# Patient Record
Sex: FEMALE | Race: WHITE | NOT HISPANIC OR LATINO
[De-identification: names, ages, dates, MRNs, and addresses within clinical notes are randomized per-mention and may not be internally consistent; named-entity substitution may affect disease eponyms.]

---

## 2021-09-16 ENCOUNTER — RESULT REVIEW (OUTPATIENT)
Age: 61
End: 2021-09-16

## 2024-05-05 ENCOUNTER — RESULT REVIEW (OUTPATIENT)
Age: 64
End: 2024-05-05

## 2024-05-05 ENCOUNTER — TRANSCRIPTION ENCOUNTER (OUTPATIENT)
Age: 64
End: 2024-05-05

## 2024-05-05 ENCOUNTER — INPATIENT (INPATIENT)
Facility: HOSPITAL | Age: 64
LOS: 4 days | DRG: 395 | End: 2024-05-10
Attending: STUDENT IN AN ORGANIZED HEALTH CARE EDUCATION/TRAINING PROGRAM | Admitting: STUDENT IN AN ORGANIZED HEALTH CARE EDUCATION/TRAINING PROGRAM
Payer: MEDICAID

## 2024-05-05 VITALS
RESPIRATION RATE: 20 BRPM | HEART RATE: 79 BPM | SYSTOLIC BLOOD PRESSURE: 135 MMHG | OXYGEN SATURATION: 98 % | HEIGHT: 67 IN | DIASTOLIC BLOOD PRESSURE: 80 MMHG | WEIGHT: 114.64 LBS

## 2024-05-05 DIAGNOSIS — K55.9 VASCULAR DISORDER OF INTESTINE, UNSPECIFIED: ICD-10-CM

## 2024-05-05 LAB
ABO RH CONFIRMATION: SIGNIFICANT CHANGE UP
ALBUMIN SERPL ELPH-MCNC: 2.9 G/DL — LOW (ref 3.3–5.2)
ALP SERPL-CCNC: 99 U/L — SIGNIFICANT CHANGE UP (ref 40–120)
ALT FLD-CCNC: 12 U/L — SIGNIFICANT CHANGE UP
ANION GAP SERPL CALC-SCNC: 15 MMOL/L — SIGNIFICANT CHANGE UP (ref 5–17)
ANISOCYTOSIS BLD QL: SIGNIFICANT CHANGE UP
APTT BLD: 37 SEC — HIGH (ref 24.5–35.6)
AST SERPL-CCNC: 22 U/L — SIGNIFICANT CHANGE UP
BASE EXCESS BLDV CALC-SCNC: -6 MMOL/L — LOW (ref -2–3)
BASOPHILS # BLD AUTO: 0 K/UL — SIGNIFICANT CHANGE UP (ref 0–0.2)
BASOPHILS NFR BLD AUTO: 0 % — SIGNIFICANT CHANGE UP (ref 0–2)
BILIRUB SERPL-MCNC: 0.5 MG/DL — SIGNIFICANT CHANGE UP (ref 0.4–2)
BLD GP AB SCN SERPL QL: SIGNIFICANT CHANGE UP
BUN SERPL-MCNC: 15.2 MG/DL — SIGNIFICANT CHANGE UP (ref 8–20)
BURR CELLS BLD QL SMEAR: PRESENT — SIGNIFICANT CHANGE UP
CA-I SERPL-SCNC: 1.08 MMOL/L — LOW (ref 1.15–1.33)
CALCIUM SERPL-MCNC: 8.3 MG/DL — LOW (ref 8.4–10.5)
CHLORIDE BLDV-SCNC: 102 MMOL/L — SIGNIFICANT CHANGE UP (ref 96–108)
CHLORIDE SERPL-SCNC: 100 MMOL/L — SIGNIFICANT CHANGE UP (ref 96–108)
CO2 SERPL-SCNC: 21 MMOL/L — LOW (ref 22–29)
CREAT SERPL-MCNC: 0.79 MG/DL — SIGNIFICANT CHANGE UP (ref 0.5–1.3)
EGFR: 85 ML/MIN/1.73M2 — SIGNIFICANT CHANGE UP
EOSINOPHIL # BLD AUTO: 0 K/UL — SIGNIFICANT CHANGE UP (ref 0–0.5)
EOSINOPHIL NFR BLD AUTO: 0 % — SIGNIFICANT CHANGE UP (ref 0–6)
GAS PNL BLDV: 133 MMOL/L — LOW (ref 136–145)
GAS PNL BLDV: SIGNIFICANT CHANGE UP
GIANT PLATELETS BLD QL SMEAR: PRESENT — SIGNIFICANT CHANGE UP
GLUCOSE BLDV-MCNC: 50 MG/DL — CRITICAL LOW (ref 70–99)
GLUCOSE SERPL-MCNC: 51 MG/DL — CRITICAL LOW (ref 70–99)
HCO3 BLDV-SCNC: 21 MMOL/L — LOW (ref 22–29)
HCT VFR BLD CALC: 32.8 % — LOW (ref 34.5–45)
HCT VFR BLDA CALC: 31 % — SIGNIFICANT CHANGE UP
HGB BLD CALC-MCNC: 10.3 G/DL — LOW (ref 11.7–16.1)
HGB BLD-MCNC: 10.3 G/DL — LOW (ref 11.5–15.5)
INR BLD: 2.45 RATIO — HIGH (ref 0.85–1.18)
LACTATE BLDV-MCNC: 4.4 MMOL/L — CRITICAL HIGH (ref 0.5–2)
LYMPHOCYTES # BLD AUTO: 2.59 K/UL — SIGNIFICANT CHANGE UP (ref 1–3.3)
LYMPHOCYTES # BLD AUTO: 6.2 % — LOW (ref 13–44)
MACROCYTES BLD QL: SIGNIFICANT CHANGE UP
MANUAL SMEAR VERIFICATION: SIGNIFICANT CHANGE UP
MCHC RBC-ENTMCNC: 19.3 PG — LOW (ref 27–34)
MCHC RBC-ENTMCNC: 31.4 GM/DL — LOW (ref 32–36)
MCV RBC AUTO: 61.3 FL — LOW (ref 80–100)
MONOCYTES # BLD AUTO: 1.09 K/UL — HIGH (ref 0–0.9)
MONOCYTES NFR BLD AUTO: 2.6 % — SIGNIFICANT CHANGE UP (ref 2–14)
NEUTROPHILS # BLD AUTO: 38.15 K/UL — HIGH (ref 1.8–7.4)
NEUTROPHILS NFR BLD AUTO: 91.2 % — HIGH (ref 43–77)
OVALOCYTES BLD QL SMEAR: SLIGHT — SIGNIFICANT CHANGE UP
PCO2 BLDV: 47 MMHG — HIGH (ref 39–42)
PH BLDV: 7.26 — LOW (ref 7.32–7.43)
PLAT MORPH BLD: NORMAL — SIGNIFICANT CHANGE UP
PLATELET # BLD AUTO: 320 K/UL — SIGNIFICANT CHANGE UP (ref 150–400)
PO2 BLDV: 73 MMHG — HIGH (ref 25–45)
POIKILOCYTOSIS BLD QL AUTO: SIGNIFICANT CHANGE UP
POLYCHROMASIA BLD QL SMEAR: SLIGHT — SIGNIFICANT CHANGE UP
POTASSIUM BLDV-SCNC: 4.4 MMOL/L — SIGNIFICANT CHANGE UP (ref 3.5–5.1)
POTASSIUM SERPL-MCNC: 4.3 MMOL/L — SIGNIFICANT CHANGE UP (ref 3.5–5.3)
POTASSIUM SERPL-SCNC: 4.3 MMOL/L — SIGNIFICANT CHANGE UP (ref 3.5–5.3)
PROT SERPL-MCNC: 5.8 G/DL — LOW (ref 6.6–8.7)
PROTHROM AB SERPL-ACNC: 26.5 SEC — HIGH (ref 9.5–13)
RBC # BLD: 5.35 M/UL — HIGH (ref 3.8–5.2)
RBC # FLD: 21.6 % — HIGH (ref 10.3–14.5)
RBC BLD AUTO: ABNORMAL
SAO2 % BLDV: 94.6 % — SIGNIFICANT CHANGE UP
SMUDGE CELLS # BLD: PRESENT — SIGNIFICANT CHANGE UP
SODIUM SERPL-SCNC: 136 MMOL/L — SIGNIFICANT CHANGE UP (ref 135–145)
TARGETS BLD QL SMEAR: SIGNIFICANT CHANGE UP
WBC # BLD: 41.83 K/UL — CRITICAL HIGH (ref 3.8–10.5)
WBC # FLD AUTO: 41.83 K/UL — CRITICAL HIGH (ref 3.8–10.5)

## 2024-05-05 PROCEDURE — 99285 EMERGENCY DEPT VISIT HI MDM: CPT

## 2024-05-05 PROCEDURE — 88307 TISSUE EXAM BY PATHOLOGIST: CPT | Mod: 26

## 2024-05-05 PROCEDURE — 99221 1ST HOSP IP/OBS SF/LOW 40: CPT | Mod: 57

## 2024-05-05 DEVICE — IMPLANTABLE DEVICE: Type: IMPLANTABLE DEVICE | Status: FUNCTIONAL

## 2024-05-05 DEVICE — WIRE GD SION BLUE STRT TIP 300CM: Type: IMPLANTABLE DEVICE | Status: FUNCTIONAL

## 2024-05-05 DEVICE — GWIRE VASC ENTRY MINISTICK MAX 5FR 10CM: Type: IMPLANTABLE DEVICE | Status: FUNCTIONAL

## 2024-05-05 DEVICE — ARISTA 3GR: Type: IMPLANTABLE DEVICE | Status: FUNCTIONAL

## 2024-05-05 DEVICE — CATH QUICK CROSS .035X135CM: Type: IMPLANTABLE DEVICE | Status: FUNCTIONAL

## 2024-05-05 DEVICE — SHEATH INTRODUCER TERUMO PINNACLE PERIPHERAL 6FR X 10CM X 0.035" MINI WIRE: Type: IMPLANTABLE DEVICE | Status: FUNCTIONAL

## 2024-05-05 DEVICE — CATH QUICK CROSS .014X135CM: Type: IMPLANTABLE DEVICE | Status: FUNCTIONAL

## 2024-05-05 DEVICE — SHEATH INTRODUCER TERUMO PINNACLE PERIPHERAL 7FR X 10CM X 0.035" MINI WIRE: Type: IMPLANTABLE DEVICE | Status: FUNCTIONAL

## 2024-05-05 DEVICE — BLLN COYOTE OTW 3X60MMX150CM: Type: IMPLANTABLE DEVICE | Status: FUNCTIONAL

## 2024-05-05 DEVICE — SURGICEL FIBRILLAR 4 X 4": Type: IMPLANTABLE DEVICE | Status: FUNCTIONAL

## 2024-05-05 DEVICE — STAPLER COVIDIEN TRI-STAPLE CURVED 45MM TAN RELOAD: Type: IMPLANTABLE DEVICE | Status: FUNCTIONAL

## 2024-05-05 RX ORDER — FENTANYL CITRATE 50 UG/ML
50 INJECTION INTRAVENOUS ONCE
Refills: 0 | Status: DISCONTINUED | OUTPATIENT
Start: 2024-05-05 | End: 2024-05-05

## 2024-05-05 RX ORDER — PIPERACILLIN AND TAZOBACTAM 4; .5 G/20ML; G/20ML
3.38 INJECTION, POWDER, LYOPHILIZED, FOR SOLUTION INTRAVENOUS ONCE
Refills: 0 | Status: COMPLETED | OUTPATIENT
Start: 2024-05-05 | End: 2024-05-05

## 2024-05-05 RX ORDER — FENTANYL CITRATE 50 UG/ML
25 INJECTION INTRAVENOUS ONCE
Refills: 0 | Status: DISCONTINUED | OUTPATIENT
Start: 2024-05-05 | End: 2024-05-05

## 2024-05-05 RX ORDER — PIPERACILLIN AND TAZOBACTAM 4; .5 G/20ML; G/20ML
3.38 INJECTION, POWDER, LYOPHILIZED, FOR SOLUTION INTRAVENOUS EVERY 8 HOURS
Refills: 0 | Status: DISCONTINUED | OUTPATIENT
Start: 2024-05-06 | End: 2024-05-07

## 2024-05-05 RX ORDER — PIPERACILLIN AND TAZOBACTAM 4; .5 G/20ML; G/20ML
3.38 INJECTION, POWDER, LYOPHILIZED, FOR SOLUTION INTRAVENOUS ONCE
Refills: 0 | Status: COMPLETED | OUTPATIENT
Start: 2024-05-06 | End: 2024-05-06

## 2024-05-05 RX ORDER — ACETAMINOPHEN 500 MG
1000 TABLET ORAL ONCE
Refills: 0 | Status: COMPLETED | OUTPATIENT
Start: 2024-05-05 | End: 2024-05-05

## 2024-05-05 RX ORDER — HEPARIN SODIUM 5000 [USP'U]/ML
4000 INJECTION INTRAVENOUS; SUBCUTANEOUS EVERY 6 HOURS
Refills: 0 | Status: DISCONTINUED | OUTPATIENT
Start: 2024-05-05 | End: 2024-05-07

## 2024-05-05 RX ORDER — CHLORHEXIDINE GLUCONATE 213 G/1000ML
1 SOLUTION TOPICAL DAILY
Refills: 0 | Status: DISCONTINUED | OUTPATIENT
Start: 2024-05-05 | End: 2024-05-07

## 2024-05-05 RX ORDER — HEPARIN SODIUM 5000 [USP'U]/ML
2000 INJECTION INTRAVENOUS; SUBCUTANEOUS EVERY 6 HOURS
Refills: 0 | Status: DISCONTINUED | OUTPATIENT
Start: 2024-05-05 | End: 2024-05-07

## 2024-05-05 RX ORDER — HEPARIN SODIUM 5000 [USP'U]/ML
INJECTION INTRAVENOUS; SUBCUTANEOUS
Qty: 25000 | Refills: 0 | Status: DISCONTINUED | OUTPATIENT
Start: 2024-05-05 | End: 2024-05-06

## 2024-05-05 RX ADMIN — Medication 1000 MILLIGRAM(S): at 22:00

## 2024-05-05 RX ADMIN — CHLORHEXIDINE GLUCONATE 1 APPLICATION(S): 213 SOLUTION TOPICAL at 21:51

## 2024-05-05 RX ADMIN — HEPARIN SODIUM 1000 UNIT(S)/HR: 5000 INJECTION INTRAVENOUS; SUBCUTANEOUS at 21:13

## 2024-05-05 RX ADMIN — Medication 400 MILLIGRAM(S): at 21:27

## 2024-05-05 RX ADMIN — FENTANYL CITRATE 50 MICROGRAM(S): 50 INJECTION INTRAVENOUS at 21:02

## 2024-05-05 RX ADMIN — FENTANYL CITRATE 50 MICROGRAM(S): 50 INJECTION INTRAVENOUS at 21:25

## 2024-05-05 RX ADMIN — FENTANYL CITRATE 25 MICROGRAM(S): 50 INJECTION INTRAVENOUS at 21:51

## 2024-05-05 NOTE — H&P ADULT - ASSESSMENT
ASSESSMENT: 62yo F presenting with acute mesenteric ischemia.    PLAN:  - to go to OR emergently with vascular and general surgery   - IV Abx  - hep gtt  - SICU aware  - rest of plan pending operative findings    Pt seen and plan discussed with attending, Dr. Zazueta

## 2024-05-05 NOTE — ED PROVIDER NOTE - PHYSICAL EXAMINATION
Gen: no acute distress  Head: normocephalic, atraumatic  EENT: EOMI  Lung: no increased work of breathing  CV:  no LE edema  Abd: soft, TTP in all 4 quadrants, +rebound tenderness +guarding  MSK: no visible deformities, full range of motion in all 4 extremities  Neuro: A&Ox4; No focal neurologic deficits

## 2024-05-05 NOTE — ED PROVIDER NOTE - OBJECTIVE STATEMENT
63-year-old female with PMH peripheral artery disease, renal artery stenosis, DVT on Eliquis transferred from [facility] for ischemic bowel and pneumoperitoneum.  Patient reports that she started having abdominal pain last night radiating to the back.  She presented to [facility] where she was found to have significant leukocytosis, lactic acidosis.  CT abdomen and pelvis demonstrated occlusion of her SMA and NELLIE with evidence of ischemic bowel and moderate pneumoperitoneum.  She was given 1 unit of FFP for elevated INR in the setting of DOAC use and was transferred to [facility] for definitive surgical management.

## 2024-05-05 NOTE — ED ADULT NURSE NOTE - CHIEF COMPLAINT QUOTE
BIBA b transfer from [facility] diagnosed with mesenteric  ischemic bowel and SBO on Plavix/ Xarelto  - Pt received 100 mcg of Fentanyl PTA / 1 Unit of PLT. Pt brought to [facility] for further abdoul and  treatment

## 2024-05-05 NOTE — ED ADULT NURSE NOTE - OBJECTIVE STATEMENT
Pt transfer from [place] c/o of abd pain x 1 month. Pt transfer from [place] c/o of abd pain x 1 month. CT abd showed small bowel dilatation with a transition point in the mid pelvis, moderate vol pneumoperitoneum and eccentric abd aortic thrombus/infrarenal aortic occlusion. 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    Pt received FFP en route. A&O SR on monitor. Tolerating RA. Skin-not intact as per pt unable to assess due pt pain level, meds provided for pain. Blood work obtained. Heparin drip started at 1000 units/hr

## 2024-05-05 NOTE — H&P ADULT - NSHPLABSRESULTS_GEN_ALL_CORE
CT ABD PEL W IV CONTRAST [id number]      PROCEDURE DATE: May 5 2024      CLINICAL INFORMATION: Abdominal pain and urinary retention.    COMPARISON: CT abdomen pelvis from 3/25/2024.    CONTRAST/COMPLICATIONS:  IV Contrast: Omnipaque 350 90 cc administered 10 cc discarded  Oral Contrast: NONE  Complications: None reported at time of study completion    PROCEDURE:  CT of the Abdomen and Pelvis was performed.  Sagittal and coronal reformats were performed.    FINDINGS:  LOWER CHEST: Within normal limits.    LIVER: Within normal limits.  BILE DUCTS: Normal caliber.  GALLBLADDER: Within normal limits.  SPLEEN: Within normal limits.  PANCREAS: Mild dilatation of the main pancreatic duct measuring up to 5 mm,  new from 3/25/2024.  ADRENALS: Stable thickening of the bilateral adrenal glands.  KIDNEYS/URETERS: Atrophic right kidney with multiple scattered cysts. No  hydronephrosis bilaterally. 2.7 cm left kidney lower pole cyst.    BLADDER: Within normal limits.  REPRODUCTIVE ORGANS: Uterus and adnexa within normal limits.    BOWEL: Multiple dilated loops of proximal small bowel to 2.5 cm with  air-fluid levels and a transition point in the mid pelvis (3:103). There is  circumferential wall thickening of the dilated loops of small bowel with  adjacent fatty stranding. Multiple locules of free air, most prominent in  the right hemiabdomen (3:58-91).  PERITONEUM: Moderate volume pneumoperitoneum.  VESSELS: Eccentric thrombus within the thoracic and proximal abdominal aorta  with complete total occlusion of the infrarenal aorta and bilateral common  iliac arteries. Reconstitution of diminutive bilateral external iliac  arteries via collaterals. Patent left renal artery stent. Severe stenosis of  the origin of the superior mesenteric artery. Complete occlusion of the  ostium of the celiac and inferior mesenteric arteries.  RETROPERITONEUM/LYMPH NODES: No lymphadenopathy.  ABDOMINAL WALL: Within normal limits.  BONES: Degenerative changes.    IMPRESSION:  Small bowel dilatation with a transition point in the mid pelvis, moderate  volume pneumoperitoneum, and eccentric abdominal aortic thrombus/infrarenal  aortic occlusion. Severe stenosis of the origin of the superior mesenteric  artery and complete occlusion of the ostium of the celiac and inferior  mesenteric arteries. These findings are concerning for mesenteric ischemia  with bowel perforation likely in the right mid abdomen.    Mild dilatation of the main pancreatic duct measured 5 mm, new from  3/25/2024.    These emergent findings were discussed with, and acknowledged by, Dr. Belcher  at 5/5/2024 5:45 PM by Dr. Carreon.

## 2024-05-05 NOTE — ED PROVIDER NOTE - CLINICAL SUMMARY MEDICAL DECISION MAKING FREE TEXT BOX
63-year-old female with PMH peripheral artery disease, renal artery stenosis, DVT on Eliquis transferred from [facility] for ischemic bowel and pneumoperitoneum.   Patient with evidence of peritonitis on exam.  She is currently hemodynamically stable.  Status post 1 unit of FFP.  Surgery at bedside.  She is to be taken to the OR for surgical management. In the meantime, she is to be started on heparin gtt for significant occlusion of the SMA and NELLIE.

## 2024-05-05 NOTE — H&P ADULT - NSHPPHYSICALEXAM_GEN_ALL_CORE
GENERAL: NAD  HEAD:  Atraumatic, normocephalic  NECK: Supple, no JVD  HEART: RRR  LUNGS: Unlabored respirations. No conversational dyspnea.   ABDOMEN: Soft, extremely tender to palpation, especially in epigastrium  EXTREMITIES: No clubbing, cyanosis, or edema  NERVOUS SYSTEM:  A&Ox3, no focal deficits   SKIN: No rashes or lesions

## 2024-05-05 NOTE — PATIENT PROFILE ADULT - FALL HARM RISK - HARM RISK INTERVENTIONS

## 2024-05-05 NOTE — CHART NOTE - NSCHARTNOTEFT_GEN_A_CORE
Patient evaluated at the bedside.   Transferred from [place] with elevated WBC, lactate and positive pneumatosis on CT. 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 SMA stenosis due to progression of thrombus at thoracoabdominal Ao. Chronic R RA occl.   Poor prognosis.   She is peritonitic and has very challenging anatomy given presence of distal aortic occl and thoracoabdominal aneurysm with large thrombus burden.   Will attempt retrograde SMA stent.   Consent in the chart.

## 2024-05-05 NOTE — ED ADULT TRIAGE NOTE - STATUS:
PRE-SEDATION ASSESSMENT    CONSENT  Risks, benefits, and alternatives have been discussed with the patient/patient representative, and patient/patient representative agrees to proceed: Yes    MEDICAL HISTORY  Significant medical/surgical history: Yes (DM, CKD, spinal fusion)  Past Complications with Sedation/Anesthesia: No  Significant Family History: No  Smoking History: No  Alcohol/Drug abuse: No  Possible Pregnancy (LMP): Not Applicable  Cardiac History: No  Respiratory History: Yes (RAD)    PHYSICAL EXAM  History and Physical Reviewed: H&P completed today  Airway Risk History: No previous complications  Airway Anatomy : Class III  Heart : Normal  Lungs : Normal  LOC/Mental Status : Normal    OTHER FINDINGS  Reviewed current medications and allergies: Yes  Pertinent lab/diagnostic test reviewed: Yes    SEDATION RISK ASSESSMENT  Risk Status ASA: Class III - Severe systemic disease, limits activity, is not incapacitated  Plan for Sedation: Moderate Sedation  Indications for Procedure/Pre-Procedure Diagnosis and Planned Procedure: aortic aneurysm/ aortogram  EKG Monitoring: Yes    NARRATIVE FINDINGS     
Applied

## 2024-05-05 NOTE — H&P ADULT - HISTORY OF PRESENT ILLNESS
62yo F w/ hx of PAD, carotid stenosis, distal aorta occlusion, renal artery stenosis s/p L renal stent, MI, tobacco use, DVT on Eliquis presenting with abdominal pain and imaging findings consistent with mesenteric ischemia. Pt states that she began feeling stabbing pain in her abdomen last night prompting her to come to ED. She has never had this pain before. On arrival to [place] she was HDS, labs notable for elevated lactate and WBC 40k. CT A/P demonstrating moderate volume pneumoperitoneum and severe stenosis of SMA and complete occlusion of ostium of celiac and inferior mesenteric arteries. Likely ischemic bowel. Transferred to [facility] for further management.

## 2024-05-05 NOTE — ED PROVIDER NOTE - ATTENDING CONTRIBUTION TO CARE
I performed a face to face bedside interview with patient regarding history of present illness, review of symptoms and past medical history. I completed an independent physical exam.  I have discussed patient's plan of care with resident.   I agree with note as stated above including HISTORY OF PRESENT ILLNESS, HIV, PAST MEDICAL/SURGICAL/FAMILY/SOCIAL HISTORY, ALLERGIES AND HOME MEDICATIONS, REVIEW OF SYSTEMS, PHYSICAL EXAM, MEDICAL DECISION MAKING and any PROGRESS NOTES during the time I functioned as the attending physician for this patient unless otherwise noted. My brief assessment is as follows:   62yo F w/ hx of PAD, carotid stenosis, distal aorta occlusion, renal artery stenosis s/p L renal stent, MI, tobacco use, DVT on Eliquis presenting with abdominal pain and imaging findings consistent with mesenteric ischemia. Pt states that she began feeling stabbing pain in her abdomen last night prompting her to come to ED. She has never had this pain before. On arrival to [place] she was HDS, labs notable for elevated lactate and WBC 40k. CT A/P demonstrating moderate volume pneumoperitoneum and severe stenosis of SMA and complete occlusion of ostium of celiac and inferior mesenteric arteries. Likely ischemic bowel. Transferred to [facility] for further management.   General in no acute distress respiratory clear cardiac no murmur abdomen tenderness palpation no distention neuro intact   surgical attending recommendations implemented admitted to SICU

## 2024-05-05 NOTE — ED ADULT TRIAGE NOTE - CHIEF COMPLAINT QUOTE
BIBA b transfer from [facility] diagnosed with mesenteric  ischemic bowel and SBO on Plavix/ Xarelto  - Pt received 100 mcg of Fentanyl PTA / 1 Unit of PLT. Pt brought to [facility] for further abdoul and  treatment

## 2024-05-05 NOTE — ED ADULT NURSE REASSESSMENT NOTE - NS ED NURSE REASSESS COMMENT FT1
ptt from [facility] 45.4. per FRANCISCO Higginbotham-, no heparin bolus. will start heparin at 10 ml/hr as ordered.

## 2024-05-05 NOTE — ED ADULT NURSE NOTE - NSFALLHARMRISKINTERV_ED_ALL_ED

## 2024-05-05 NOTE — H&P ADULT - ATTENDING COMMENTS
63-year-old female with extensive pmhx including CHF, smoking, asthma/COPD, HLD, anemia, bipolar, CAD/MI, CKD, hepatitis C, lap cholecystectomy, tubal ligation, open appendectomy, carotid stenosis, BEULAH s/p L stent, distal aortic occlusion transferred from [facility] with pneumoperitoneum and mesenteric ischemia     Admit to SICU   Emergent OR with ACS/vascular surgery for exploratory laparotomy with potential for but not limited to bowel resection, ostomy, mesenteric angiogram with intervention, and temporary abdominal closure.   Risks, benefits, and alternatives to planned procedure discussed at length with patient and her . They understand that she is high risk for morbidity/mortality with planned intervention and alternatives. They would like to proceed with surgery with no limitations on medical/surgical interventions including CPR, intubations, short/long term feeding tubes, IV fluids, and antibiotics. We did, however, discuss the possibility of complete ischaemia of the intraabdominal viscera and the futility of further interventions in that circumstance.   Hep gtt   IV antibiotics   Med rec

## 2024-05-05 NOTE — ED ADULT TRIAGE NOTE - PRO INTERPRETER NEED 2
PT stated she received a call from a probabtion officer that called her by her maiden name. Pt stated the only person that would know that is her . Pt was in obvious distress with tears in her eyes and pacing. PRN was given and accepted by patient. English

## 2024-05-05 NOTE — PATIENT PROFILE ADULT - VISION (WITH CORRECTIVE LENSES IF THE PATIENT USUALLY WEARS THEM):
detailed exam Partially impaired: cannot see medication labels or newsprint, but can see obstacles in path, and the surrounding layout; can count fingers at arm's length

## 2024-05-06 ENCOUNTER — RESULT REVIEW (OUTPATIENT)
Age: 64
End: 2024-05-06

## 2024-05-06 ENCOUNTER — TRANSCRIPTION ENCOUNTER (OUTPATIENT)
Age: 64
End: 2024-05-06

## 2024-05-06 LAB
ALBUMIN SERPL ELPH-MCNC: 1.9 G/DL — LOW (ref 3.3–5.2)
ALBUMIN SERPL ELPH-MCNC: 2.2 G/DL — LOW (ref 3.3–5.2)
ALP SERPL-CCNC: 63 U/L — SIGNIFICANT CHANGE UP (ref 40–120)
ALP SERPL-CCNC: 75 U/L — SIGNIFICANT CHANGE UP (ref 40–120)
ALT FLD-CCNC: 9 U/L — SIGNIFICANT CHANGE UP
ALT FLD-CCNC: 9 U/L — SIGNIFICANT CHANGE UP
ANION GAP SERPL CALC-SCNC: 12 MMOL/L — SIGNIFICANT CHANGE UP (ref 5–17)
ANION GAP SERPL CALC-SCNC: 15 MMOL/L — SIGNIFICANT CHANGE UP (ref 5–17)
APTT BLD: 54 SEC — HIGH (ref 24.5–35.6)
APTT BLD: 56.7 SEC — HIGH (ref 24.5–35.6)
APTT BLD: 61.6 SEC — HIGH (ref 24.5–35.6)
APTT BLD: 91.3 SEC — HIGH (ref 24.5–35.6)
APTT BLD: >200 SEC — CRITICAL HIGH (ref 24.5–35.6)
AST SERPL-CCNC: 18 U/L — SIGNIFICANT CHANGE UP
AST SERPL-CCNC: 19 U/L — SIGNIFICANT CHANGE UP
BASOPHILS # BLD AUTO: 0.05 K/UL — SIGNIFICANT CHANGE UP (ref 0–0.2)
BASOPHILS # BLD AUTO: 0.06 K/UL — SIGNIFICANT CHANGE UP (ref 0–0.2)
BASOPHILS # BLD AUTO: 0.07 K/UL — SIGNIFICANT CHANGE UP (ref 0–0.2)
BASOPHILS NFR BLD AUTO: 0.2 % — SIGNIFICANT CHANGE UP (ref 0–2)
BILIRUB DIRECT SERPL-MCNC: 0.2 MG/DL — SIGNIFICANT CHANGE UP (ref 0–0.3)
BILIRUB DIRECT SERPL-MCNC: 0.3 MG/DL — SIGNIFICANT CHANGE UP (ref 0–0.3)
BILIRUB INDIRECT FLD-MCNC: 0.1 MG/DL — LOW (ref 0.2–1)
BILIRUB INDIRECT FLD-MCNC: 0.1 MG/DL — LOW (ref 0.2–1)
BILIRUB SERPL-MCNC: 0.3 MG/DL — LOW (ref 0.4–2)
BILIRUB SERPL-MCNC: 0.4 MG/DL — SIGNIFICANT CHANGE UP (ref 0.4–2)
BUN SERPL-MCNC: 10.1 MG/DL — SIGNIFICANT CHANGE UP (ref 8–20)
BUN SERPL-MCNC: 11.4 MG/DL — SIGNIFICANT CHANGE UP (ref 8–20)
BUN SERPL-MCNC: 8.6 MG/DL — SIGNIFICANT CHANGE UP (ref 8–20)
BUN SERPL-MCNC: 9.4 MG/DL — SIGNIFICANT CHANGE UP (ref 8–20)
CALCIUM SERPL-MCNC: 6.7 MG/DL — LOW (ref 8.4–10.5)
CALCIUM SERPL-MCNC: 6.8 MG/DL — LOW (ref 8.4–10.5)
CALCIUM SERPL-MCNC: 7.1 MG/DL — LOW (ref 8.4–10.5)
CALCIUM SERPL-MCNC: 7.3 MG/DL — LOW (ref 8.4–10.5)
CHLORIDE SERPL-SCNC: 101 MMOL/L — SIGNIFICANT CHANGE UP (ref 96–108)
CHLORIDE SERPL-SCNC: 103 MMOL/L — SIGNIFICANT CHANGE UP (ref 96–108)
CHLORIDE SERPL-SCNC: 103 MMOL/L — SIGNIFICANT CHANGE UP (ref 96–108)
CHLORIDE SERPL-SCNC: 107 MMOL/L — SIGNIFICANT CHANGE UP (ref 96–108)
CK SERPL-CCNC: 26 U/L — SIGNIFICANT CHANGE UP (ref 25–170)
CO2 SERPL-SCNC: 17 MMOL/L — LOW (ref 22–29)
CO2 SERPL-SCNC: 17 MMOL/L — LOW (ref 22–29)
CO2 SERPL-SCNC: 18 MMOL/L — LOW (ref 22–29)
CO2 SERPL-SCNC: 18 MMOL/L — LOW (ref 22–29)
CREAT SERPL-MCNC: 0.56 MG/DL — SIGNIFICANT CHANGE UP (ref 0.5–1.3)
CREAT SERPL-MCNC: 0.58 MG/DL — SIGNIFICANT CHANGE UP (ref 0.5–1.3)
CREAT SERPL-MCNC: 0.59 MG/DL — SIGNIFICANT CHANGE UP (ref 0.5–1.3)
CREAT SERPL-MCNC: 0.64 MG/DL — SIGNIFICANT CHANGE UP (ref 0.5–1.3)
EGFR: 100 ML/MIN/1.73M2 — SIGNIFICANT CHANGE UP
EGFR: 102 ML/MIN/1.73M2 — SIGNIFICANT CHANGE UP
EGFR: 103 ML/MIN/1.73M2 — SIGNIFICANT CHANGE UP
EGFR: 104 ML/MIN/1.73M2 — SIGNIFICANT CHANGE UP
EOSINOPHIL # BLD AUTO: 0 K/UL — SIGNIFICANT CHANGE UP (ref 0–0.5)
EOSINOPHIL # BLD AUTO: 0.01 K/UL — SIGNIFICANT CHANGE UP (ref 0–0.5)
EOSINOPHIL # BLD AUTO: 0.07 K/UL — SIGNIFICANT CHANGE UP (ref 0–0.5)
EOSINOPHIL NFR BLD AUTO: 0 % — SIGNIFICANT CHANGE UP (ref 0–6)
EOSINOPHIL NFR BLD AUTO: 0 % — SIGNIFICANT CHANGE UP (ref 0–6)
EOSINOPHIL NFR BLD AUTO: 0.2 % — SIGNIFICANT CHANGE UP (ref 0–6)
FIBRINOGEN PPP-MCNC: 339 MG/DL — SIGNIFICANT CHANGE UP (ref 200–450)
GAS PNL BLDA: SIGNIFICANT CHANGE UP
GLUCOSE SERPL-MCNC: 103 MG/DL — HIGH (ref 70–99)
GLUCOSE SERPL-MCNC: 105 MG/DL — HIGH (ref 70–99)
GLUCOSE SERPL-MCNC: 113 MG/DL — HIGH (ref 70–99)
GLUCOSE SERPL-MCNC: 98 MG/DL — SIGNIFICANT CHANGE UP (ref 70–99)
HCT VFR BLD CALC: 26.6 % — LOW (ref 34.5–45)
HCT VFR BLD CALC: 26.8 % — LOW (ref 34.5–45)
HCT VFR BLD CALC: 27.1 % — LOW (ref 34.5–45)
HCT VFR BLD CALC: 30 % — LOW (ref 34.5–45)
HGB BLD-MCNC: 8.7 G/DL — LOW (ref 11.5–15.5)
HGB BLD-MCNC: 8.9 G/DL — LOW (ref 11.5–15.5)
HGB BLD-MCNC: 8.9 G/DL — LOW (ref 11.5–15.5)
HGB BLD-MCNC: 9.8 G/DL — LOW (ref 11.5–15.5)
IMM GRANULOCYTES NFR BLD AUTO: 1.3 % — HIGH (ref 0–0.9)
IMM GRANULOCYTES NFR BLD AUTO: 1.5 % — HIGH (ref 0–0.9)
IMM GRANULOCYTES NFR BLD AUTO: 2 % — HIGH (ref 0–0.9)
INR BLD: 2.33 RATIO — HIGH (ref 0.85–1.18)
INR BLD: 3.14 RATIO — HIGH (ref 0.85–1.18)
INR BLD: 4.1 RATIO — HIGH (ref 0.85–1.18)
LACTATE SERPL-SCNC: 3.1 MMOL/L — HIGH (ref 0.5–2)
LITHIUM SERPL-MCNC: <0.1 MMOL/L — LOW (ref 0.5–1.5)
LYMPHOCYTES # BLD AUTO: 2.18 K/UL — SIGNIFICANT CHANGE UP (ref 1–3.3)
LYMPHOCYTES # BLD AUTO: 2.35 K/UL — SIGNIFICANT CHANGE UP (ref 1–3.3)
LYMPHOCYTES # BLD AUTO: 2.71 K/UL — SIGNIFICANT CHANGE UP (ref 1–3.3)
LYMPHOCYTES # BLD AUTO: 7.7 % — LOW (ref 13–44)
LYMPHOCYTES # BLD AUTO: 8.3 % — LOW (ref 13–44)
LYMPHOCYTES # BLD AUTO: 8.3 % — LOW (ref 13–44)
MAGNESIUM SERPL-MCNC: 1.3 MG/DL — LOW (ref 1.6–2.6)
MAGNESIUM SERPL-MCNC: 2.3 MG/DL — SIGNIFICANT CHANGE UP (ref 1.6–2.6)
MAGNESIUM SERPL-MCNC: 2.4 MG/DL — SIGNIFICANT CHANGE UP (ref 1.8–2.6)
MAGNESIUM SERPL-MCNC: 2.7 MG/DL — HIGH (ref 1.6–2.6)
MCHC RBC-ENTMCNC: 19.4 PG — LOW (ref 27–34)
MCHC RBC-ENTMCNC: 19.6 PG — LOW (ref 27–34)
MCHC RBC-ENTMCNC: 19.6 PG — LOW (ref 27–34)
MCHC RBC-ENTMCNC: 19.7 PG — LOW (ref 27–34)
MCHC RBC-ENTMCNC: 32.7 GM/DL — SIGNIFICANT CHANGE UP (ref 32–36)
MCHC RBC-ENTMCNC: 32.7 GM/DL — SIGNIFICANT CHANGE UP (ref 32–36)
MCHC RBC-ENTMCNC: 32.8 GM/DL — SIGNIFICANT CHANGE UP (ref 32–36)
MCHC RBC-ENTMCNC: 33.2 GM/DL — SIGNIFICANT CHANGE UP (ref 32–36)
MCV RBC AUTO: 59.2 FL — LOW (ref 80–100)
MCV RBC AUTO: 59.5 FL — LOW (ref 80–100)
MCV RBC AUTO: 60 FL — LOW (ref 80–100)
MCV RBC AUTO: 60 FL — LOW (ref 80–100)
MONOCYTES # BLD AUTO: 0.96 K/UL — HIGH (ref 0–0.9)
MONOCYTES # BLD AUTO: 1.07 K/UL — HIGH (ref 0–0.9)
MONOCYTES # BLD AUTO: 1.19 K/UL — HIGH (ref 0–0.9)
MONOCYTES NFR BLD AUTO: 3.4 % — SIGNIFICANT CHANGE UP (ref 2–14)
MONOCYTES NFR BLD AUTO: 3.6 % — SIGNIFICANT CHANGE UP (ref 2–14)
MONOCYTES NFR BLD AUTO: 3.8 % — SIGNIFICANT CHANGE UP (ref 2–14)
MRSA PCR RESULT.: SIGNIFICANT CHANGE UP
NEUTROPHILS # BLD AUTO: 24.39 K/UL — HIGH (ref 1.8–7.4)
NEUTROPHILS # BLD AUTO: 24.64 K/UL — HIGH (ref 1.8–7.4)
NEUTROPHILS # BLD AUTO: 28.12 K/UL — HIGH (ref 1.8–7.4)
NEUTROPHILS NFR BLD AUTO: 85.9 % — HIGH (ref 43–77)
NEUTROPHILS NFR BLD AUTO: 86.4 % — HIGH (ref 43–77)
NEUTROPHILS NFR BLD AUTO: 87 % — HIGH (ref 43–77)
PHOSPHATE SERPL-MCNC: 2.7 MG/DL — SIGNIFICANT CHANGE UP (ref 2.4–4.7)
PHOSPHATE SERPL-MCNC: 3 MG/DL — SIGNIFICANT CHANGE UP (ref 2.4–4.7)
PHOSPHATE SERPL-MCNC: 3 MG/DL — SIGNIFICANT CHANGE UP (ref 2.4–4.7)
PHOSPHATE SERPL-MCNC: 3.4 MG/DL — SIGNIFICANT CHANGE UP (ref 2.4–4.7)
PLATELET # BLD AUTO: 269 K/UL — SIGNIFICANT CHANGE UP (ref 150–400)
PLATELET # BLD AUTO: 288 K/UL — SIGNIFICANT CHANGE UP (ref 150–400)
PLATELET # BLD AUTO: 291 K/UL — SIGNIFICANT CHANGE UP (ref 150–400)
PLATELET # BLD AUTO: 316 K/UL — SIGNIFICANT CHANGE UP (ref 150–400)
POTASSIUM SERPL-MCNC: 3.9 MMOL/L — SIGNIFICANT CHANGE UP (ref 3.5–5.3)
POTASSIUM SERPL-MCNC: 3.9 MMOL/L — SIGNIFICANT CHANGE UP (ref 3.5–5.3)
POTASSIUM SERPL-MCNC: 4 MMOL/L — SIGNIFICANT CHANGE UP (ref 3.5–5.3)
POTASSIUM SERPL-MCNC: 4 MMOL/L — SIGNIFICANT CHANGE UP (ref 3.5–5.3)
POTASSIUM SERPL-SCNC: 3.9 MMOL/L — SIGNIFICANT CHANGE UP (ref 3.5–5.3)
POTASSIUM SERPL-SCNC: 3.9 MMOL/L — SIGNIFICANT CHANGE UP (ref 3.5–5.3)
POTASSIUM SERPL-SCNC: 4 MMOL/L — SIGNIFICANT CHANGE UP (ref 3.5–5.3)
POTASSIUM SERPL-SCNC: 4 MMOL/L — SIGNIFICANT CHANGE UP (ref 3.5–5.3)
PROT SERPL-MCNC: 4 G/DL — LOW (ref 6.6–8.7)
PROT SERPL-MCNC: 4.5 G/DL — LOW (ref 6.6–8.7)
PROTHROM AB SERPL-ACNC: 25.2 SEC — HIGH (ref 9.5–13)
PROTHROM AB SERPL-ACNC: 33.7 SEC — HIGH (ref 9.5–13)
PROTHROM AB SERPL-ACNC: 43.7 SEC — HIGH (ref 9.5–13)
RBC # BLD: 4.43 M/UL — SIGNIFICANT CHANGE UP (ref 3.8–5.2)
RBC # BLD: 4.52 M/UL — SIGNIFICANT CHANGE UP (ref 3.8–5.2)
RBC # BLD: 4.53 M/UL — SIGNIFICANT CHANGE UP (ref 3.8–5.2)
RBC # BLD: 5.04 M/UL — SIGNIFICANT CHANGE UP (ref 3.8–5.2)
RBC # FLD: 20.3 % — HIGH (ref 10.3–14.5)
RBC # FLD: 20.4 % — HIGH (ref 10.3–14.5)
RBC # FLD: 20.5 % — HIGH (ref 10.3–14.5)
RBC # FLD: 21 % — HIGH (ref 10.3–14.5)
S AUREUS DNA NOSE QL NAA+PROBE: SIGNIFICANT CHANGE UP
SODIUM SERPL-SCNC: 133 MMOL/L — LOW (ref 135–145)
SODIUM SERPL-SCNC: 136 MMOL/L — SIGNIFICANT CHANGE UP (ref 135–145)
TROPONIN T, HIGH SENSITIVITY RESULT: 14 NG/L — SIGNIFICANT CHANGE UP (ref 0–51)
TROPONIN T, HIGH SENSITIVITY RESULT: 16 NG/L — SIGNIFICANT CHANGE UP (ref 0–51)
WBC # BLD: 28.23 K/UL — HIGH (ref 3.8–10.5)
WBC # BLD: 28.34 K/UL — HIGH (ref 3.8–10.5)
WBC # BLD: 32.74 K/UL — HIGH (ref 3.8–10.5)
WBC # BLD: 40.45 K/UL — CRITICAL HIGH (ref 3.8–10.5)
WBC # FLD AUTO: 28.23 K/UL — HIGH (ref 3.8–10.5)
WBC # FLD AUTO: 28.34 K/UL — HIGH (ref 3.8–10.5)
WBC # FLD AUTO: 32.74 K/UL — HIGH (ref 3.8–10.5)
WBC # FLD AUTO: 40.45 K/UL — CRITICAL HIGH (ref 3.8–10.5)

## 2024-05-06 PROCEDURE — 99291 CRITICAL CARE FIRST HOUR: CPT | Mod: 57

## 2024-05-06 PROCEDURE — 44120 REMOVAL OF SMALL INTESTINE: CPT

## 2024-05-06 PROCEDURE — 99291 CRITICAL CARE FIRST HOUR: CPT

## 2024-05-06 PROCEDURE — 71045 X-RAY EXAM CHEST 1 VIEW: CPT | Mod: 26

## 2024-05-06 PROCEDURE — 93010 ELECTROCARDIOGRAM REPORT: CPT

## 2024-05-06 PROCEDURE — 93306 TTE W/DOPPLER COMPLETE: CPT | Mod: 26

## 2024-05-06 PROCEDURE — 37236 OPEN/PERQ PLACE STENT 1ST: CPT | Mod: RT

## 2024-05-06 PROCEDURE — 75625 CONTRAST EXAM ABDOMINL AORTA: CPT | Mod: 26

## 2024-05-06 RX ORDER — CHLORHEXIDINE GLUCONATE 213 G/1000ML
15 SOLUTION TOPICAL EVERY 12 HOURS
Refills: 0 | Status: DISCONTINUED | OUTPATIENT
Start: 2024-05-06 | End: 2024-05-07

## 2024-05-06 RX ORDER — NOREPINEPHRINE BITARTRATE/D5W 8 MG/250ML
0.12 PLASTIC BAG, INJECTION (ML) INTRAVENOUS
Qty: 8 | Refills: 0 | Status: DISCONTINUED | OUTPATIENT
Start: 2024-05-06 | End: 2024-05-07

## 2024-05-06 RX ORDER — SODIUM CHLORIDE 9 MG/ML
10 INJECTION INTRAMUSCULAR; INTRAVENOUS; SUBCUTANEOUS
Refills: 0 | Status: DISCONTINUED | OUTPATIENT
Start: 2024-05-06 | End: 2024-05-07

## 2024-05-06 RX ORDER — CALCIUM GLUCONATE 100 MG/ML
2 VIAL (ML) INTRAVENOUS
Refills: 0 | Status: COMPLETED | OUTPATIENT
Start: 2024-05-06 | End: 2024-05-07

## 2024-05-06 RX ORDER — KETAMINE HYDROCHLORIDE 100 MG/ML
1 INJECTION INTRAMUSCULAR; INTRAVENOUS
Qty: 1000 | Refills: 0 | Status: DISCONTINUED | OUTPATIENT
Start: 2024-05-06 | End: 2024-05-07

## 2024-05-06 RX ORDER — ACETAMINOPHEN 500 MG
1000 TABLET ORAL ONCE
Refills: 0 | Status: COMPLETED | OUTPATIENT
Start: 2024-05-06 | End: 2024-05-06

## 2024-05-06 RX ORDER — CALCIUM GLUCONATE 100 MG/ML
2 VIAL (ML) INTRAVENOUS ONCE
Refills: 0 | Status: COMPLETED | OUTPATIENT
Start: 2024-05-06 | End: 2024-05-06

## 2024-05-06 RX ORDER — SODIUM CHLORIDE 9 MG/ML
1000 INJECTION, SOLUTION INTRAVENOUS
Refills: 0 | Status: DISCONTINUED | OUTPATIENT
Start: 2024-05-06 | End: 2024-05-07

## 2024-05-06 RX ORDER — FENTANYL CITRATE 50 UG/ML
50 INJECTION INTRAVENOUS ONCE
Refills: 0 | Status: DISCONTINUED | OUTPATIENT
Start: 2024-05-06 | End: 2024-05-06

## 2024-05-06 RX ORDER — HYDROCORTISONE 20 MG
50 TABLET ORAL EVERY 6 HOURS
Refills: 0 | Status: DISCONTINUED | OUTPATIENT
Start: 2024-05-06 | End: 2024-05-07

## 2024-05-06 RX ORDER — HEPARIN SODIUM 5000 [USP'U]/ML
800 INJECTION INTRAVENOUS; SUBCUTANEOUS
Qty: 25000 | Refills: 0 | Status: DISCONTINUED | OUTPATIENT
Start: 2024-05-06 | End: 2024-05-07

## 2024-05-06 RX ORDER — VASOPRESSIN 20 [USP'U]/ML
0.04 INJECTION INTRAVENOUS
Qty: 40 | Refills: 0 | Status: DISCONTINUED | OUTPATIENT
Start: 2024-05-06 | End: 2024-05-07

## 2024-05-06 RX ORDER — HYDROCORTISONE 20 MG
100 TABLET ORAL ONCE
Refills: 0 | Status: COMPLETED | OUTPATIENT
Start: 2024-05-06 | End: 2024-05-06

## 2024-05-06 RX ORDER — FENTANYL CITRATE 50 UG/ML
0.5 INJECTION INTRAVENOUS
Qty: 5000 | Refills: 0 | Status: DISCONTINUED | OUTPATIENT
Start: 2024-05-06 | End: 2024-05-06

## 2024-05-06 RX ORDER — SODIUM CHLORIDE 9 MG/ML
1000 INJECTION, SOLUTION INTRAVENOUS ONCE
Refills: 0 | Status: COMPLETED | OUTPATIENT
Start: 2024-05-06 | End: 2024-05-06

## 2024-05-06 RX ORDER — FENTANYL CITRATE 50 UG/ML
1.5 INJECTION INTRAVENOUS
Qty: 2500 | Refills: 0 | Status: DISCONTINUED | OUTPATIENT
Start: 2024-05-06 | End: 2024-05-07

## 2024-05-06 RX ORDER — HYDROMORPHONE HYDROCHLORIDE 2 MG/ML
0.5 INJECTION INTRAMUSCULAR; INTRAVENOUS; SUBCUTANEOUS ONCE
Refills: 0 | Status: DISCONTINUED | OUTPATIENT
Start: 2024-05-06 | End: 2024-05-06

## 2024-05-06 RX ORDER — FENTANYL CITRATE 50 UG/ML
50 INJECTION INTRAVENOUS
Refills: 0 | Status: DISCONTINUED | OUTPATIENT
Start: 2024-05-06 | End: 2024-05-06

## 2024-05-06 RX ORDER — NOREPINEPHRINE BITARTRATE/D5W 8 MG/250ML
0.05 PLASTIC BAG, INJECTION (ML) INTRAVENOUS
Qty: 16 | Refills: 0 | Status: DISCONTINUED | OUTPATIENT
Start: 2024-05-06 | End: 2024-05-06

## 2024-05-06 RX ORDER — MAGNESIUM SULFATE 500 MG/ML
2 VIAL (ML) INJECTION
Refills: 0 | Status: COMPLETED | OUTPATIENT
Start: 2024-05-06 | End: 2024-05-06

## 2024-05-06 RX ORDER — HYDROCORTISONE 20 MG
50 TABLET ORAL EVERY 8 HOURS
Refills: 0 | Status: DISCONTINUED | OUTPATIENT
Start: 2024-05-06 | End: 2024-05-06

## 2024-05-06 RX ORDER — HYDROMORPHONE HYDROCHLORIDE 2 MG/ML
0.5 INJECTION INTRAMUSCULAR; INTRAVENOUS; SUBCUTANEOUS
Refills: 0 | Status: DISCONTINUED | OUTPATIENT
Start: 2024-05-06 | End: 2024-05-07

## 2024-05-06 RX ORDER — ALBUMIN HUMAN 25 %
100 VIAL (ML) INTRAVENOUS ONCE
Refills: 0 | Status: COMPLETED | OUTPATIENT
Start: 2024-05-06 | End: 2024-05-06

## 2024-05-06 RX ADMIN — Medication 400 MILLIGRAM(S): at 14:15

## 2024-05-06 RX ADMIN — VASOPRESSIN 6 UNIT(S)/MIN: 20 INJECTION INTRAVENOUS at 04:10

## 2024-05-06 RX ADMIN — SODIUM CHLORIDE 1000 MILLILITER(S): 9 INJECTION, SOLUTION INTRAVENOUS at 17:41

## 2024-05-06 RX ADMIN — HYDROMORPHONE HYDROCHLORIDE 0.5 MILLIGRAM(S): 2 INJECTION INTRAMUSCULAR; INTRAVENOUS; SUBCUTANEOUS at 12:00

## 2024-05-06 RX ADMIN — HYDROMORPHONE HYDROCHLORIDE 0.5 MILLIGRAM(S): 2 INJECTION INTRAMUSCULAR; INTRAVENOUS; SUBCUTANEOUS at 16:40

## 2024-05-06 RX ADMIN — FENTANYL CITRATE 50 MICROGRAM(S): 50 INJECTION INTRAVENOUS at 11:00

## 2024-05-06 RX ADMIN — KETAMINE HYDROCHLORIDE 5.24 MG/KG/HR: 100 INJECTION INTRAMUSCULAR; INTRAVENOUS at 17:22

## 2024-05-06 RX ADMIN — HEPARIN SODIUM 800 UNIT(S)/HR: 5000 INJECTION INTRAVENOUS; SUBCUTANEOUS at 22:35

## 2024-05-06 RX ADMIN — Medication 100 MILLIGRAM(S): at 04:10

## 2024-05-06 RX ADMIN — PIPERACILLIN AND TAZOBACTAM 25 GRAM(S): 4; .5 INJECTION, POWDER, LYOPHILIZED, FOR SOLUTION INTRAVENOUS at 22:36

## 2024-05-06 RX ADMIN — VASOPRESSIN 6 UNIT(S)/MIN: 20 INJECTION INTRAVENOUS at 22:40

## 2024-05-06 RX ADMIN — Medication 200 GRAM(S): at 04:13

## 2024-05-06 RX ADMIN — Medication 50 MILLILITER(S): at 09:42

## 2024-05-06 RX ADMIN — Medication 63.75 MILLIMOLE(S): at 12:45

## 2024-05-06 RX ADMIN — SODIUM CHLORIDE 1000 MILLILITER(S): 9 INJECTION, SOLUTION INTRAVENOUS at 04:10

## 2024-05-06 RX ADMIN — PIPERACILLIN AND TAZOBACTAM 25 GRAM(S): 4; .5 INJECTION, POWDER, LYOPHILIZED, FOR SOLUTION INTRAVENOUS at 04:13

## 2024-05-06 RX ADMIN — FENTANYL CITRATE 50 MICROGRAM(S): 50 INJECTION INTRAVENOUS at 10:00

## 2024-05-06 RX ADMIN — HEPARIN SODIUM 800 UNIT(S)/HR: 5000 INJECTION INTRAVENOUS; SUBCUTANEOUS at 08:53

## 2024-05-06 RX ADMIN — HYDROMORPHONE HYDROCHLORIDE 0.5 MILLIGRAM(S): 2 INJECTION INTRAMUSCULAR; INTRAVENOUS; SUBCUTANEOUS at 12:59

## 2024-05-06 RX ADMIN — HEPARIN SODIUM 800 UNIT(S)/HR: 5000 INJECTION INTRAVENOUS; SUBCUTANEOUS at 16:08

## 2024-05-06 RX ADMIN — CHLORHEXIDINE GLUCONATE 1 APPLICATION(S): 213 SOLUTION TOPICAL at 11:58

## 2024-05-06 RX ADMIN — Medication 50 MILLIGRAM(S): at 12:45

## 2024-05-06 RX ADMIN — Medication 25 GRAM(S): at 06:01

## 2024-05-06 RX ADMIN — Medication 1000 MILLIGRAM(S): at 14:30

## 2024-05-06 RX ADMIN — FENTANYL CITRATE 50 MICROGRAM(S): 50 INJECTION INTRAVENOUS at 09:42

## 2024-05-06 RX ADMIN — Medication 25 GRAM(S): at 07:58

## 2024-05-06 RX ADMIN — SODIUM CHLORIDE 100 MILLILITER(S): 9 INJECTION, SOLUTION INTRAVENOUS at 04:11

## 2024-05-06 RX ADMIN — FENTANYL CITRATE 50 MICROGRAM(S): 50 INJECTION INTRAVENOUS at 10:42

## 2024-05-06 RX ADMIN — PIPERACILLIN AND TAZOBACTAM 25 GRAM(S): 4; .5 INJECTION, POWDER, LYOPHILIZED, FOR SOLUTION INTRAVENOUS at 17:32

## 2024-05-06 RX ADMIN — CHLORHEXIDINE GLUCONATE 15 MILLILITER(S): 213 SOLUTION TOPICAL at 17:32

## 2024-05-06 RX ADMIN — Medication 50 MILLIGRAM(S): at 23:27

## 2024-05-06 RX ADMIN — HYDROMORPHONE HYDROCHLORIDE 0.5 MILLIGRAM(S): 2 INJECTION INTRAMUSCULAR; INTRAVENOUS; SUBCUTANEOUS at 13:15

## 2024-05-06 RX ADMIN — KETAMINE HYDROCHLORIDE 1.31 MG/KG/HR: 100 INJECTION INTRAMUSCULAR; INTRAVENOUS at 04:11

## 2024-05-06 RX ADMIN — HYDROMORPHONE HYDROCHLORIDE 0.5 MILLIGRAM(S): 2 INJECTION INTRAMUSCULAR; INTRAVENOUS; SUBCUTANEOUS at 12:07

## 2024-05-06 RX ADMIN — PIPERACILLIN AND TAZOBACTAM 25 GRAM(S): 4; .5 INJECTION, POWDER, LYOPHILIZED, FOR SOLUTION INTRAVENOUS at 10:09

## 2024-05-06 RX ADMIN — Medication 50 MILLIGRAM(S): at 17:32

## 2024-05-06 RX ADMIN — FENTANYL CITRATE 2.62 MICROGRAM(S)/KG/HR: 50 INJECTION INTRAVENOUS at 04:11

## 2024-05-06 RX ADMIN — Medication 200 GRAM(S): at 23:28

## 2024-05-06 RX ADMIN — HYDROMORPHONE HYDROCHLORIDE 0.5 MILLIGRAM(S): 2 INJECTION INTRAMUSCULAR; INTRAVENOUS; SUBCUTANEOUS at 16:22

## 2024-05-06 RX ADMIN — CHLORHEXIDINE GLUCONATE 15 MILLILITER(S): 213 SOLUTION TOPICAL at 05:58

## 2024-05-06 NOTE — BRIEF OPERATIVE NOTE - OPERATION/FINDINGS
Midline laparotomy incision. No frankly ischemic bowel segments. Patchy areas of dusky appearance. Small perforation identified in mid jejunum. Resected and bowel left in discontinuity. Abdomen thoroughly irrigated and hemostasis achieved. Abthera wound vac dressing applied.

## 2024-05-06 NOTE — PROGRESS NOTE ADULT - ASSESSMENT
Assessment:   64yo Female h/o chronic distal aorta occlusion presented with stabbing abdominal pain and CT demonstrating mesenteric ischemia. Now s/p ex lap, retrograde SMA angiography, angioplasty with stenting, and small bowel resection. Left in discontinuity for resuscitation. Plan for ACS to RTOR today for second look and closure.    Plan:   - Cont heparin gtt  - No acute intervention for chronic ble occlusion, asymptomatic  - Monitor abdominal exam  - Remainder of care per primary team  - Appreciate excellent SICU care

## 2024-05-06 NOTE — PROCEDURE NOTE - ADDITIONAL PROCEDURE DETAILS
Confirmation of venous access done via column test and visualization if wire in jugular lumen with US.

## 2024-05-06 NOTE — CHART NOTE - NSCHARTNOTEFT_GEN_A_CORE
Pt received from OR s/p ex-lap, 10 cm SBR, SMA stenting and angio. Pt left open in discontinuity. Pt received on levo of .06 with MAP of 61. Pt blood pressure rapidly declining. Levo increased. Maxed at 0.3. Vasopressin added at 0.04. Stress dose steroids administered as pt was induced with etomidate in the OR. 100mg hydrocortisone x1 to be followed by hydrocortisone 50 Q8 hours. 1L of plasmalyte given. Pt placed on ketamine for sedation. STAT labs sent. On bedside POCUS EF looks diminished. EKG obtained, formal TTE ordered, and troponin sent. Pt required 3cc of filiberto. Not able to palpate distal pulses in any extremities and hands/feet are cold to the touch. Skin without mottling. RLE has dopplerable DP signal. LLE only has dopplerable popliteal signal.   BP starting to improve to with bolus and steroid administration. Pt now on .13 of levo, vaso .04.     Pt currently In an unspecified shock state. Likely septic shock from mesenteric ischemia and reperfusion s/p SMA stenting with possible cardiogenic component. Pt is hypotensive requiring duel pressors and stress dosed steroids. Pt is mechanically ventilated and sedated with ketamine. 120 minutes spent of critical care ultrasounding patient, discussing treatment planning with attending and nursing staff, and resuscitating patient bedside. Pt received from OR s/p ex-lap, 10 cm SBR, SMA stenting and angio. Pt left open in discontinuity. Pt received on levo of .06 with MAP of 61. Pt blood pressure rapidly declining. Levo increased. Maxed at 0.3. Vasopressin added at 0.04. Stress dose steroids administered as pt was induced with etomidate in the OR. 100mg hydrocortisone x1 to be followed by hydrocortisone 50mg Q8 hours. 1L bolus of plasmalyte administered. Pt placed on ketamine gtt for sedation. STAT post-op labs sent. On bedside POCUS, EF looks diminished. EKG obtained, formal TTE ordered, and troponin sent. Pt required 3cc of filiberto stick for maps in 40s. Not able to palpate distal pulses in any extremities and hands/feet are cold to the touch. Skin without mottling. RLE has dopplerable DP signal on ultrasound. LLE only has dopplerable popliteal signals on ultrasound with no DP or PT signals.  BP starting to improve to with bolus and steroid administration. Pt now on .15 of levo and vaso at 0.04.     Pt currently In an unspecified shock state. Likely septic shock from mesenteric ischemia and reperfusion s/p SMA stenting with possible cardiogenic component. Pt is hypotensive requiring duel pressors and stress dosed steroids. Pt is mechanically ventilated and sedated with ketamine. 120 minutes spent of critical care ultrasounding patient, discussing treatment planning with attending and nursing staff, and resuscitating patient bedside.

## 2024-05-06 NOTE — BRIEF OPERATIVE NOTE - OPERATION/FINDINGS
SMA identified, no palpable pulse. Vessel dissected and endovascular access achieved in retrograde fashion. Angiogram demonstrating SMA occlusion. Balloon angioplasty of SMA with stent placement. Restoration of blood flow confirmed on angiogram. Arteriotomy closed with 6-0 prolene sutures. Hemostasis achieved.

## 2024-05-06 NOTE — PROGRESS NOTE ADULT - SUBJECTIVE AND OBJECTIVE BOX
SICU Attending Progress Note    24H Events:    Patient presented to the ED for abdominal pain.  She has a history of severe PAD and was found to have abdominal pain.  CT showed mesenteric ischemia with intestinal perforation.  She was taken emergently for exploratory laparotomy and bowel resection.  Post-operatively, she is intubated and sedated, on increasing doses of vasopressors.            chlorhexidine 0.12% Liquid 15 milliLiter(s) Oral Mucosa every 12 hours  chlorhexidine 2% Cloths 1 Application(s) Topical daily  fentaNYL   Infusion. 1.5 MICROgram(s)/kG/Hr IV Continuous <Continuous>  heparin   Injectable 4000 Unit(s) IV Push every 6 hours PRN  heparin   Injectable 2000 Unit(s) IV Push every 6 hours PRN  heparin  Infusion. 800 Unit(s)/Hr IV Continuous <Continuous>  hydrocortisone sodium succinate Injectable 50 milliGRAM(s) IV Push every 6 hours  HYDROmorphone  Injectable 0.5 milliGRAM(s) IV Push every 3 hours PRN  ketamine Infusion. 1 mG/kG/Hr IV Continuous <Continuous>  lactated ringers. 1000 milliLiter(s) IV Continuous <Continuous>  norepinephrine Infusion 0.12 MICROgram(s)/kG/Min IV Continuous <Continuous>  piperacillin/tazobactam IVPB.. 3.375 Gram(s) IV Intermittent every 8 hours  vasopressin Infusion 0.04 Unit(s)/Min IV Continuous <Continuous>    T(C): 38 (05-06-24 @ 14:15), Max: 38.1 (05-06-24 @ 12:30)  HR: 86 (05-06-24 @ 14:15) (62 - 95)  BP: 91/58 (05-06-24 @ 13:45) (91/58 - 147/52)  RR: 25 (05-06-24 @ 14:15) (12 - 26)  SpO2: 100% (05-06-24 @ 13:45) (91% - 100%)  Mode: AC/ CMV (Assist Control/ Continuous Mandatory Ventilation), RR (machine): 18, TV (machine): 400, FiO2: 40, PEEP: 6, ITime: 1, MAP: 9, PIP: 19    05-05-24 @ 07:01  -  05-06-24 @ 07:00  --------------------------------------------------------  IN: 889.6 mL / OUT: 685 mL / NET: 204.6 mL    05-06-24 @ 07:01  -  05-06-24 @ 14:28  --------------------------------------------------------  IN: 1044.3 mL / OUT: 120 mL / NET: 924.3 mL      PHYSICAL EXAM:  GENERAL:  Resting in bed  HEENT:  Head atraumatic, EOMI, PERRLA, conjunctiva and sclera clear; Moist mucous membranes, normal oropharynx  NECK: Supple, No JVD, no lymphadenopathy, no thyroid nodules or enlargement  CHEST/LUNG: Clear to auscultation bilaterally; No rales, rhonchi, wheezing, or rubs. On mechanical ventilation.    HEART: Regular rate and rhythm; No murmurs, rubs, or gallops  ABDOMEN: Abdomen open with ABThera in place, serosanguinous output.  EXTREMITIES:  2+ Peripheral Pulses, brisk capillary refill. No clubbing, cyanosis, or edema  NERVOUS SYSTEM:  Alert & Oriented X3, non-focal and spontaneous movements of all extremities  SKIN: No rashes or lesions        Assesment:63yFemale      Plan:    Neuro:  Pulm:  Card:  GI:  Endo:  Renal:  ID: piperacillin/tazobactam IVPB.. 3.375 Gram(s) IV Intermittent every 8 hours    Heme:  Tubes/Lines:  Dispo/Code Status:       Patient required critical care management and is at risk for life threatening decompensation  I provided ________of non-continuous care to the patient.     SICU Attending Progress Note    24H Events:    Patient presented to the ED for abdominal pain.  She has a history of severe PAD and was found to have abdominal pain.  CT showed mesenteric ischemia with intestinal perforation.  She was taken emergently for exploratory laparotomy and bowel resection.  Post-operatively, she is intubated and sedated, on increasing doses of vasopressors.            chlorhexidine 0.12% Liquid 15 milliLiter(s) Oral Mucosa every 12 hours  chlorhexidine 2% Cloths 1 Application(s) Topical daily  fentaNYL   Infusion. 1.5 MICROgram(s)/kG/Hr IV Continuous <Continuous>  heparin   Injectable 4000 Unit(s) IV Push every 6 hours PRN  heparin   Injectable 2000 Unit(s) IV Push every 6 hours PRN  heparin  Infusion. 800 Unit(s)/Hr IV Continuous <Continuous>  hydrocortisone sodium succinate Injectable 50 milliGRAM(s) IV Push every 6 hours  HYDROmorphone  Injectable 0.5 milliGRAM(s) IV Push every 3 hours PRN  ketamine Infusion. 1 mG/kG/Hr IV Continuous <Continuous>  lactated ringers. 1000 milliLiter(s) IV Continuous <Continuous>  norepinephrine Infusion 0.12 MICROgram(s)/kG/Min IV Continuous <Continuous>  piperacillin/tazobactam IVPB.. 3.375 Gram(s) IV Intermittent every 8 hours  vasopressin Infusion 0.04 Unit(s)/Min IV Continuous <Continuous>    T(C): 38 (05-06-24 @ 14:15), Max: 38.1 (05-06-24 @ 12:30)  HR: 86 (05-06-24 @ 14:15) (62 - 95)  BP: 91/58 (05-06-24 @ 13:45) (91/58 - 147/52)  RR: 25 (05-06-24 @ 14:15) (12 - 26)  SpO2: 100% (05-06-24 @ 13:45) (91% - 100%)  Mode: AC/ CMV (Assist Control/ Continuous Mandatory Ventilation), RR (machine): 18, TV (machine): 400, FiO2: 40, PEEP: 6, ITime: 1, MAP: 9, PIP: 19    05-05-24 @ 07:01  -  05-06-24 @ 07:00  --------------------------------------------------------  IN: 889.6 mL / OUT: 685 mL / NET: 204.6 mL    05-06-24 @ 07:01  -  05-06-24 @ 14:28  --------------------------------------------------------  IN: 1044.3 mL / OUT: 120 mL / NET: 924.3 mL      PHYSICAL EXAM:  GENERAL:  Resting in bed  HEENT:  Head atraumatic, EOMI, PERRLA, conjunctiva and sclera clear; Moist mucous membranes, normal oropharynx  NECK: Supple, No JVD, no lymphadenopathy, no thyroid nodules or enlargement  CHEST/LUNG: Clear to auscultation bilaterally; No rales, rhonchi, wheezing, or rubs. On mechanical ventilation.    HEART: Regular rate and rhythm; No murmurs, rubs, or gallops  ABDOMEN: Abdomen open with ABThera in place, serosanguinous output.  EXTREMITIES:  2+ Peripheral Pulses, brisk capillary refill. No clubbing, cyanosis, or edema  NERVOUS SYSTEM:  Alert & Oriented X3, non-focal and spontaneous movements of all extremities  SKIN: No rashes or lesions        Assesment:63yFemale with severe diffuse PAD presents with abdominal pain, leukocytosis, and pneumoperitoneum, now s/p exploratory laparotomy with SBR.  Patient in septic shock, intubated, and on vasopressors.        Plan:    Neuro: On Ketamine GTT and Fentanyl GTT.  Will increase Ketamine GTT dosing, and possibly start Dilaudid.  Patient has extensive prior narcotic usage, and is on unprescribed Suboxone at home.    Pulm: Intubated, on mechanical ventilation.  ETT in good position per CXR.  ABGs showing no hypoxia at this time.  Will attempt daily SAT and possible SBT when she's ready for extubation.  Card: On Levophed and Vasopressin GTTs for hypotension likely due to septic shock.  ECHO from 12/2023 shows EF 40%, ECHO pending.  Will trend troponins.  GI: NPO, NGT to LIS.  Bowel in discontinuity, will plan for anastomosis when patient is more HDS.  Endo: BG monitoring, ISS coverage. SDS on board Hydrocortisone 50 Q6H.   Renal: Roberts in place with UOP 40cc-60cc/hr.    ID: piperacillin/tazobactam IVPB.. 3.375 Gram(s) IV Intermittent every 8 hours  Trend WBC, cultures pending.  Heme: Trend Hgb, transfuse PRN.  Tubes/Lines: Roberts, ETT, TLC, Arterial line.  Dispo/Code Status: Continued SICU care      Patient required critical care management and is at risk for life threatening decompensation  I provided ________of non-continuous care to the patient.

## 2024-05-06 NOTE — PROGRESS NOTE ADULT - SUBJECTIVE AND OBJECTIVE BOX
VASCULAR SURGERY PROGRESS NOTE    Subjective:   Patient examined at bedside this AM. Several hrs s/p ex lap, retrograde SMA angiogram, angioplasty and stenting with ACS performing bowel resection. ROS limited 2/2 patient being intubated    Objective:  Vital Signs  T(C): 36.3 (05-06 @ 07:30), Max: 37.7 (05-05 @ 22:04)  HR: 70 (05-06 @ 07:00) (62 - 95)  BP: 114/56 (05-06 @ 06:30) (112/94 - 147/52)  RR: 19 (05-06 @ 07:00) (13 - 24)  SpO2: 99% (05-06 @ 03:00) (91% - 99%)      05-05-24 @ 07:01  -  05-06-24 @ 07:00  --------------------------------------------------------  IN:  Total IN: 0 mL    OUT:    Indwelling Catheter - Urethral (mL): 385 mL    Nasogastric/Oral tube (mL): 100 mL    VAC (Vacuum Assisted Closure) System (mL): 200 mL  Total OUT: 685 mL    Total NET: -685 mL      Physical Exam:  General: alert and oriented, NAD  Resp: intubated on ventilator   Abdomen: soft, tender to palpation, wound vac in place with good suction  Extremities: no edema, warm well perfused b/l, motor sensory intact, PT signals R>L  Skin: warm, dry, appropriate color      Labs:                        8.9    32.74 )-----------( 269      ( 06 May 2024 03:30 )             27.1   05-06    136  |  107  |  11.4  ----------------------------<  113<H>  4.0   |  17.0<L>  |  0.56    Ca    6.8<L>      06 May 2024 03:30  Phos  3.0     05-06  Mg     1.3     05-06    TPro  4.0<L>  /  Alb  1.9<L>  /  TBili  0.3<L>  /  DBili  0.2  /  AST  18  /  ALT  9   /  AlkPhos  63  05-06

## 2024-05-06 NOTE — INITIAL ORGAN DONATION REFERRAL - NSORGANDONATIONCLINICALTRIGGER_GEN_ALL_CORE
Family discussion withdrawal of life-sustaining therapies is anticipated Mc[place]ath Coma Scale is less than or equal to 5/Family discussion withdrawal of life-sustaining therapies is anticipated

## 2024-05-06 NOTE — PROVIDER CONTACT NOTE (CRITICAL VALUE NOTIFICATION) - ACTION/TREATMENT ORDERED:
sicu team made aware
PT in or. PA will inform
Patient started on ABX
hold heparin for an hour and redraw after

## 2024-05-07 ENCOUNTER — RESULT REVIEW (OUTPATIENT)
Age: 64
End: 2024-05-07

## 2024-05-07 LAB
ALBUMIN SERPL ELPH-MCNC: 1.8 G/DL — LOW (ref 3.3–5.2)
ALBUMIN SERPL ELPH-MCNC: 2.8 G/DL — LOW (ref 3.3–5.2)
ALP SERPL-CCNC: 45 U/L — SIGNIFICANT CHANGE UP (ref 40–120)
ALP SERPL-CCNC: 50 U/L — SIGNIFICANT CHANGE UP (ref 40–120)
ALT FLD-CCNC: 10 U/L — SIGNIFICANT CHANGE UP
ALT FLD-CCNC: 21 U/L — SIGNIFICANT CHANGE UP
ANION GAP SERPL CALC-SCNC: 12 MMOL/L — SIGNIFICANT CHANGE UP (ref 5–17)
ANION GAP SERPL CALC-SCNC: 15 MMOL/L — SIGNIFICANT CHANGE UP (ref 5–17)
APTT BLD: 42.6 SEC — HIGH (ref 24.5–35.6)
APTT BLD: 44 SEC — HIGH (ref 24.5–35.6)
APTT BLD: 93.4 SEC — HIGH (ref 24.5–35.6)
AST SERPL-CCNC: 113 U/L — HIGH
AST SERPL-CCNC: 42 U/L — HIGH
BASOPHILS # BLD AUTO: 0.02 K/UL — SIGNIFICANT CHANGE UP (ref 0–0.2)
BASOPHILS NFR BLD AUTO: 0.1 % — SIGNIFICANT CHANGE UP (ref 0–2)
BILIRUB DIRECT SERPL-MCNC: 0.2 MG/DL — SIGNIFICANT CHANGE UP (ref 0–0.3)
BILIRUB DIRECT SERPL-MCNC: 0.2 MG/DL — SIGNIFICANT CHANGE UP (ref 0–0.3)
BILIRUB INDIRECT FLD-MCNC: 0.1 MG/DL — LOW (ref 0.2–1)
BILIRUB INDIRECT FLD-MCNC: 0.2 MG/DL — SIGNIFICANT CHANGE UP (ref 0.2–1)
BILIRUB SERPL-MCNC: 0.3 MG/DL — LOW (ref 0.4–2)
BILIRUB SERPL-MCNC: 0.5 MG/DL — SIGNIFICANT CHANGE UP (ref 0.4–2)
BUN SERPL-MCNC: 12 MG/DL — SIGNIFICANT CHANGE UP (ref 8–20)
BUN SERPL-MCNC: 7.6 MG/DL — LOW (ref 8–20)
CALCIUM SERPL-MCNC: 7.2 MG/DL — LOW (ref 8.4–10.5)
CALCIUM SERPL-MCNC: 7.6 MG/DL — LOW (ref 8.4–10.5)
CHLORIDE SERPL-SCNC: 103 MMOL/L — SIGNIFICANT CHANGE UP (ref 96–108)
CHLORIDE SERPL-SCNC: 103 MMOL/L — SIGNIFICANT CHANGE UP (ref 96–108)
CO2 SERPL-SCNC: 18 MMOL/L — LOW (ref 22–29)
CO2 SERPL-SCNC: 22 MMOL/L — SIGNIFICANT CHANGE UP (ref 22–29)
CREAT SERPL-MCNC: 0.46 MG/DL — LOW (ref 0.5–1.3)
CREAT SERPL-MCNC: 0.57 MG/DL — SIGNIFICANT CHANGE UP (ref 0.5–1.3)
EGFR: 103 ML/MIN/1.73M2 — SIGNIFICANT CHANGE UP
EGFR: 109 ML/MIN/1.73M2 — SIGNIFICANT CHANGE UP
EOSINOPHIL # BLD AUTO: 0 K/UL — SIGNIFICANT CHANGE UP (ref 0–0.5)
EOSINOPHIL NFR BLD AUTO: 0 % — SIGNIFICANT CHANGE UP (ref 0–6)
GAS PNL BLDA: SIGNIFICANT CHANGE UP
GAS PNL BLDA: SIGNIFICANT CHANGE UP
GLUCOSE BLDC GLUCOMTR-MCNC: 208 MG/DL — HIGH (ref 70–99)
GLUCOSE SERPL-MCNC: 121 MG/DL — HIGH (ref 70–99)
GLUCOSE SERPL-MCNC: 64 MG/DL — LOW (ref 70–99)
HCT VFR BLD CALC: 24.6 % — LOW (ref 34.5–45)
HCT VFR BLD CALC: 26.7 % — LOW (ref 34.5–45)
HGB BLD-MCNC: 8.2 G/DL — LOW (ref 11.5–15.5)
HGB BLD-MCNC: 8.7 G/DL — LOW (ref 11.5–15.5)
IMM GRANULOCYTES NFR BLD AUTO: 1.9 % — HIGH (ref 0–0.9)
INR BLD: 1.75 RATIO — HIGH (ref 0.85–1.18)
INR BLD: 1.78 RATIO — HIGH (ref 0.85–1.18)
LYMPHOCYTES # BLD AUTO: 1.7 K/UL — SIGNIFICANT CHANGE UP (ref 1–3.3)
LYMPHOCYTES # BLD AUTO: 7 % — LOW (ref 13–44)
MAGNESIUM SERPL-MCNC: 2 MG/DL — SIGNIFICANT CHANGE UP (ref 1.8–2.6)
MAGNESIUM SERPL-MCNC: 2.1 MG/DL — SIGNIFICANT CHANGE UP (ref 1.6–2.6)
MCHC RBC-ENTMCNC: 19.5 PG — LOW (ref 27–34)
MCHC RBC-ENTMCNC: 19.6 PG — LOW (ref 27–34)
MCHC RBC-ENTMCNC: 32.6 GM/DL — SIGNIFICANT CHANGE UP (ref 32–36)
MCHC RBC-ENTMCNC: 33.3 GM/DL — SIGNIFICANT CHANGE UP (ref 32–36)
MCV RBC AUTO: 58.7 FL — LOW (ref 80–100)
MCV RBC AUTO: 59.7 FL — LOW (ref 80–100)
MONOCYTES # BLD AUTO: 0.42 K/UL — SIGNIFICANT CHANGE UP (ref 0–0.9)
MONOCYTES NFR BLD AUTO: 1.7 % — LOW (ref 2–14)
NEUTROPHILS # BLD AUTO: 21.78 K/UL — HIGH (ref 1.8–7.4)
NEUTROPHILS NFR BLD AUTO: 89.3 % — HIGH (ref 43–77)
PHOSPHATE SERPL-MCNC: 2.2 MG/DL — LOW (ref 2.4–4.7)
PHOSPHATE SERPL-MCNC: 2.8 MG/DL — SIGNIFICANT CHANGE UP (ref 2.4–4.7)
PLATELET # BLD AUTO: 250 K/UL — SIGNIFICANT CHANGE UP (ref 150–400)
PLATELET # BLD AUTO: 256 K/UL — SIGNIFICANT CHANGE UP (ref 150–400)
POTASSIUM SERPL-MCNC: 3.5 MMOL/L — SIGNIFICANT CHANGE UP (ref 3.5–5.3)
POTASSIUM SERPL-MCNC: 3.5 MMOL/L — SIGNIFICANT CHANGE UP (ref 3.5–5.3)
POTASSIUM SERPL-SCNC: 3.5 MMOL/L — SIGNIFICANT CHANGE UP (ref 3.5–5.3)
POTASSIUM SERPL-SCNC: 3.5 MMOL/L — SIGNIFICANT CHANGE UP (ref 3.5–5.3)
PROT SERPL-MCNC: 3.9 G/DL — LOW (ref 6.6–8.7)
PROT SERPL-MCNC: 4.2 G/DL — LOW (ref 6.6–8.7)
PROTHROM AB SERPL-ACNC: 19.1 SEC — HIGH (ref 9.5–13)
PROTHROM AB SERPL-ACNC: 19.4 SEC — HIGH (ref 9.5–13)
RBC # BLD: 4.19 M/UL — SIGNIFICANT CHANGE UP (ref 3.8–5.2)
RBC # BLD: 4.47 M/UL — SIGNIFICANT CHANGE UP (ref 3.8–5.2)
RBC # FLD: 19.9 % — HIGH (ref 10.3–14.5)
RBC # FLD: 20.1 % — HIGH (ref 10.3–14.5)
SODIUM SERPL-SCNC: 136 MMOL/L — SIGNIFICANT CHANGE UP (ref 135–145)
SODIUM SERPL-SCNC: 137 MMOL/L — SIGNIFICANT CHANGE UP (ref 135–145)
TROPONIN T, HIGH SENSITIVITY RESULT: 10 NG/L — SIGNIFICANT CHANGE UP (ref 0–51)
WBC # BLD: 24.39 K/UL — HIGH (ref 3.8–10.5)
WBC # BLD: 30.22 K/UL — HIGH (ref 3.8–10.5)
WBC # FLD AUTO: 24.39 K/UL — HIGH (ref 3.8–10.5)
WBC # FLD AUTO: 30.22 K/UL — HIGH (ref 3.8–10.5)

## 2024-05-07 PROCEDURE — 71045 X-RAY EXAM CHEST 1 VIEW: CPT | Mod: 26,77

## 2024-05-07 PROCEDURE — 99232 SBSQ HOSP IP/OBS MODERATE 35: CPT | Mod: 57

## 2024-05-07 PROCEDURE — 49002 REOPENING OF ABDOMEN: CPT | Mod: 58

## 2024-05-07 PROCEDURE — 71045 X-RAY EXAM CHEST 1 VIEW: CPT | Mod: 26

## 2024-05-07 PROCEDURE — 74018 RADEX ABDOMEN 1 VIEW: CPT | Mod: 26

## 2024-05-07 PROCEDURE — 44120 REMOVAL OF SMALL INTESTINE: CPT | Mod: 58

## 2024-05-07 DEVICE — STAPLER COVIDIEN ENDO GIA 80-3.8MM BLUE RELOAD: Type: IMPLANTABLE DEVICE | Status: FUNCTIONAL

## 2024-05-07 DEVICE — STAPLER COVIDIEN ENDO GIA 80-3.8MM BLUE: Type: IMPLANTABLE DEVICE | Status: FUNCTIONAL

## 2024-05-07 RX ORDER — PIPERACILLIN AND TAZOBACTAM 4; .5 G/20ML; G/20ML
3.38 INJECTION, POWDER, LYOPHILIZED, FOR SOLUTION INTRAVENOUS EVERY 8 HOURS
Refills: 0 | Status: DISCONTINUED | OUTPATIENT
Start: 2024-05-08 | End: 2024-05-10

## 2024-05-07 RX ORDER — SODIUM CHLORIDE 9 MG/ML
10 INJECTION INTRAMUSCULAR; INTRAVENOUS; SUBCUTANEOUS
Refills: 0 | Status: DISCONTINUED | OUTPATIENT
Start: 2024-05-07 | End: 2024-05-10

## 2024-05-07 RX ORDER — CHLORHEXIDINE GLUCONATE 213 G/1000ML
1 SOLUTION TOPICAL DAILY
Refills: 0 | Status: DISCONTINUED | OUTPATIENT
Start: 2024-05-07 | End: 2024-05-10

## 2024-05-07 RX ORDER — THIAMINE MONONITRATE (VIT B1) 100 MG
500 TABLET ORAL EVERY 8 HOURS
Refills: 0 | Status: DISCONTINUED | OUTPATIENT
Start: 2024-05-07 | End: 2024-05-07

## 2024-05-07 RX ORDER — HYDROCORTISONE 20 MG
TABLET ORAL
Refills: 0 | Status: DISCONTINUED | OUTPATIENT
Start: 2024-05-07 | End: 2024-05-09

## 2024-05-07 RX ORDER — HYDROMORPHONE HYDROCHLORIDE 2 MG/ML
0.5 INJECTION INTRAMUSCULAR; INTRAVENOUS; SUBCUTANEOUS
Refills: 0 | Status: DISCONTINUED | OUTPATIENT
Start: 2024-05-07 | End: 2024-05-09

## 2024-05-07 RX ORDER — ALBUMIN HUMAN 25 %
100 VIAL (ML) INTRAVENOUS EVERY 4 HOURS
Refills: 0 | Status: DISCONTINUED | OUTPATIENT
Start: 2024-05-07 | End: 2024-05-07

## 2024-05-07 RX ORDER — HYDROCORTISONE 20 MG
50 TABLET ORAL EVERY 6 HOURS
Refills: 0 | Status: COMPLETED | OUTPATIENT
Start: 2024-05-07 | End: 2024-05-08

## 2024-05-07 RX ORDER — HYDROCORTISONE 20 MG
50 TABLET ORAL EVERY 6 HOURS
Refills: 0 | Status: DISCONTINUED | OUTPATIENT
Start: 2024-05-07 | End: 2024-05-07

## 2024-05-07 RX ORDER — SODIUM CHLORIDE 9 MG/ML
1000 INJECTION, SOLUTION INTRAVENOUS ONCE
Refills: 0 | Status: COMPLETED | OUTPATIENT
Start: 2024-05-07 | End: 2024-05-07

## 2024-05-07 RX ORDER — HYDROCORTISONE 20 MG
25 TABLET ORAL EVERY 12 HOURS
Refills: 0 | Status: DISCONTINUED | OUTPATIENT
Start: 2024-05-09 | End: 2024-05-09

## 2024-05-07 RX ORDER — POTASSIUM PHOSPHATE, MONOBASIC POTASSIUM PHOSPHATE, DIBASIC 236; 224 MG/ML; MG/ML
30 INJECTION, SOLUTION INTRAVENOUS ONCE
Refills: 0 | Status: COMPLETED | OUTPATIENT
Start: 2024-05-07 | End: 2024-05-07

## 2024-05-07 RX ORDER — HYDROCORTISONE 20 MG
50 TABLET ORAL EVERY 12 HOURS
Refills: 0 | Status: COMPLETED | OUTPATIENT
Start: 2024-05-08 | End: 2024-05-09

## 2024-05-07 RX ORDER — ALBUMIN HUMAN 25 %
100 VIAL (ML) INTRAVENOUS EVERY 4 HOURS
Refills: 0 | Status: COMPLETED | OUTPATIENT
Start: 2024-05-07 | End: 2024-05-08

## 2024-05-07 RX ORDER — SODIUM CHLORIDE 9 MG/ML
1000 INJECTION, SOLUTION INTRAVENOUS
Refills: 0 | Status: DISCONTINUED | OUTPATIENT
Start: 2024-05-07 | End: 2024-05-10

## 2024-05-07 RX ORDER — HEPARIN SODIUM 5000 [USP'U]/ML
800 INJECTION INTRAVENOUS; SUBCUTANEOUS
Qty: 25000 | Refills: 0 | Status: DISCONTINUED | OUTPATIENT
Start: 2024-05-07 | End: 2024-05-09

## 2024-05-07 RX ORDER — KETAMINE HYDROCHLORIDE 100 MG/ML
2.5 INJECTION INTRAMUSCULAR; INTRAVENOUS
Qty: 1000 | Refills: 0 | Status: DISCONTINUED | OUTPATIENT
Start: 2024-05-07 | End: 2024-05-08

## 2024-05-07 RX ORDER — THIAMINE MONONITRATE (VIT B1) 100 MG
500 TABLET ORAL EVERY 8 HOURS
Refills: 0 | Status: DISCONTINUED | OUTPATIENT
Start: 2024-05-07 | End: 2024-05-08

## 2024-05-07 RX ORDER — PANTOPRAZOLE SODIUM 20 MG/1
40 TABLET, DELAYED RELEASE ORAL DAILY
Refills: 0 | Status: DISCONTINUED | OUTPATIENT
Start: 2024-05-07 | End: 2024-05-10

## 2024-05-07 RX ORDER — FENTANYL CITRATE 50 UG/ML
2 INJECTION INTRAVENOUS
Qty: 2500 | Refills: 0 | Status: DISCONTINUED | OUTPATIENT
Start: 2024-05-07 | End: 2024-05-08

## 2024-05-07 RX ORDER — VASOPRESSIN 20 [USP'U]/ML
0.04 INJECTION INTRAVENOUS
Qty: 40 | Refills: 0 | Status: DISCONTINUED | OUTPATIENT
Start: 2024-05-07 | End: 2024-05-08

## 2024-05-07 RX ORDER — DEXTROSE 50 % IN WATER 50 %
50 SYRINGE (ML) INTRAVENOUS ONCE
Refills: 0 | Status: COMPLETED | OUTPATIENT
Start: 2024-05-07 | End: 2024-05-07

## 2024-05-07 RX ORDER — CHLORHEXIDINE GLUCONATE 213 G/1000ML
15 SOLUTION TOPICAL EVERY 12 HOURS
Refills: 0 | Status: DISCONTINUED | OUTPATIENT
Start: 2024-05-07 | End: 2024-05-08

## 2024-05-07 RX ADMIN — KETAMINE HYDROCHLORIDE 5.24 MG/KG/HR: 100 INJECTION INTRAMUSCULAR; INTRAVENOUS at 07:25

## 2024-05-07 RX ADMIN — CHLORHEXIDINE GLUCONATE 15 MILLILITER(S): 213 SOLUTION TOPICAL at 17:21

## 2024-05-07 RX ADMIN — FENTANYL CITRATE 10.5 MICROGRAM(S)/KG/HR: 50 INJECTION INTRAVENOUS at 16:18

## 2024-05-07 RX ADMIN — SODIUM CHLORIDE 100 MILLILITER(S): 9 INJECTION, SOLUTION INTRAVENOUS at 17:55

## 2024-05-07 RX ADMIN — KETAMINE HYDROCHLORIDE 13.1 MG/KG/HR: 100 INJECTION INTRAMUSCULAR; INTRAVENOUS at 17:47

## 2024-05-07 RX ADMIN — SODIUM CHLORIDE 2000 MILLILITER(S): 9 INJECTION, SOLUTION INTRAVENOUS at 02:18

## 2024-05-07 RX ADMIN — Medication 50 MILLIGRAM(S): at 05:03

## 2024-05-07 RX ADMIN — Medication 50 MILLILITER(S): at 17:21

## 2024-05-07 RX ADMIN — Medication 50 MILLILITER(S): at 05:03

## 2024-05-07 RX ADMIN — Medication 50 MILLIGRAM(S): at 11:04

## 2024-05-07 RX ADMIN — Medication 105 MILLIGRAM(S): at 05:03

## 2024-05-07 RX ADMIN — CHLORHEXIDINE GLUCONATE 1 APPLICATION(S): 213 SOLUTION TOPICAL at 11:05

## 2024-05-07 RX ADMIN — Medication 50 MILLILITER(S): at 08:09

## 2024-05-07 RX ADMIN — CHLORHEXIDINE GLUCONATE 15 MILLILITER(S): 213 SOLUTION TOPICAL at 05:04

## 2024-05-07 RX ADMIN — FENTANYL CITRATE 7.86 MICROGRAM(S)/KG/HR: 50 INJECTION INTRAVENOUS at 05:50

## 2024-05-07 RX ADMIN — Medication 50 MILLIGRAM(S): at 17:21

## 2024-05-07 RX ADMIN — Medication 50 MILLILITER(S): at 11:09

## 2024-05-07 RX ADMIN — HEPARIN SODIUM 8 UNIT(S)/HR: 5000 INJECTION INTRAVENOUS; SUBCUTANEOUS at 23:04

## 2024-05-07 RX ADMIN — Medication 50 MILLILITER(S): at 21:40

## 2024-05-07 RX ADMIN — PIPERACILLIN AND TAZOBACTAM 25 GRAM(S): 4; .5 INJECTION, POWDER, LYOPHILIZED, FOR SOLUTION INTRAVENOUS at 14:00

## 2024-05-07 RX ADMIN — PIPERACILLIN AND TAZOBACTAM 25 GRAM(S): 4; .5 INJECTION, POWDER, LYOPHILIZED, FOR SOLUTION INTRAVENOUS at 05:03

## 2024-05-07 RX ADMIN — Medication 200 GRAM(S): at 01:00

## 2024-05-07 RX ADMIN — PANTOPRAZOLE SODIUM 40 MILLIGRAM(S): 20 TABLET, DELAYED RELEASE ORAL at 21:40

## 2024-05-07 RX ADMIN — Medication 105 MILLIGRAM(S): at 21:41

## 2024-05-07 RX ADMIN — KETAMINE HYDROCHLORIDE 5.24 MG/KG/HR: 100 INJECTION INTRAMUSCULAR; INTRAVENOUS at 00:23

## 2024-05-07 RX ADMIN — POTASSIUM PHOSPHATE, MONOBASIC POTASSIUM PHOSPHATE, DIBASIC 83.33 MILLIMOLE(S): 236; 224 INJECTION, SOLUTION INTRAVENOUS at 20:45

## 2024-05-07 NOTE — DIETITIAN INITIAL EVALUATION ADULT - OTHER INFO
Pt is a 63 year old F w/ hx of PAD, carotid stenosis, distal aorta occlusion, renal artery stenosis s/p L renal stent, MI, tobacco use, DVT on Eliquis presenting with abdominal pain and imaging findings consistent with mesenteric ischemia. Pt states that she began feeling stabbing pain in her abdomen last night prompting her to come to ED. She has never had this pain before. On arrival to [place] she was HDS, labs notable for elevated lactate and WBC 40k. CT A/P demonstrating moderate volume pneumoperitoneum and severe stenosis of SMA and complete occlusion of ostium of celiac and inferior mesenteric arteries. Likely ischemic bowel. Transferred to [facility] for further management. CT showed mesenteric ischemia with intestinal perforation.  She was taken emergently for exploratory laparotomy and bowel resection.  Post-operatively, she is intubated and sedated, on increasing doses of vasopressors.

## 2024-05-07 NOTE — PROGRESS NOTE ADULT - SUBJECTIVE AND OBJECTIVE BOX
HPI/Overnight Events:   Patient examined at bedside this AM. POD 2 s/p ex lap, retrograde SMA angiogram, angioplasty and stenting with ACS performing bowel resection. Remains intubated, off sedation.    Vital Signs Last 24 Hrs  T(C): 37.8 (07 May 2024 06:30), Max: 38.1 (06 May 2024 12:30)  T(F): 100 (07 May 2024 06:30), Max: 100.6 (06 May 2024 12:30)  HR: 77 (07 May 2024 06:30) (63 - 88)  BP: 133/70 (07 May 2024 06:00) (91/58 - 133/70)  BP(mean): 87 (07 May 2024 06:00) (66 - 89)  RR: 16 (07 May 2024 06:30) (12 - 26)  SpO2: 100% (07 May 2024 06:00) (95% - 100%)    Parameters below as of 07 May 2024 00:00  Patient On (Oxygen Delivery Method): ventilator        I&O's Detail    05 May 2024 07:01  -  06 May 2024 07:00  --------------------------------------------------------  IN:    FentaNYL: 7.8 mL    Heparin Infusion: 28 mL    IV PiggyBack: 100 mL    IV PiggyBack: 100 mL    IV PiggyBack: 100 mL    Ketamine: 3.9 mL    Lactated Ringers: 400 mL    multiple electrolytes Injection Type 1 Bolus: 100 mL    Norepinephrine: 25.9 mL    Vasopressin: 24 mL  Total IN: 889.6 mL    OUT:    Indwelling Catheter - Urethral (mL): 385 mL    Nasogastric/Oral tube (mL): 100 mL    VAC (Vacuum Assisted Closure) System (mL): 200 mL  Total OUT: 685 mL    Total NET: 204.6 mL      06 May 2024 07:01  -  07 May 2024 06:34  --------------------------------------------------------  IN:    Albumin 25%  -  50 mL: 100 mL    FentaNYL: 15.7 mL    FentaNYL: 173.1 mL    Heparin Infusion: 8 mL    Heparin Infusion: 152 mL    IV PiggyBack: 50 mL    IV PiggyBack: 100 mL    IV PiggyBack: 250 mL    IV PiggyBack: 275 mL    IV PiggyBack: 200 mL    Ketamine: 9.1 mL    Ketamine: 221.7 mL    Lactated Ringers: 2000 mL    multiple electrolytes Injection Type 1 Bolus: 2000 mL    Norepinephrine: 91.5 mL    Vasopressin: 54 mL  Total IN: 5700.1 mL    OUT:    Indwelling Catheter - Urethral (mL): 540 mL    Nasogastric/Oral tube (mL): 130 mL    VAC (Vacuum Assisted Closure) System (mL): 300 mL  Total OUT: 970 mL    Total NET: 4730.1 mL          MEDICATIONS  (STANDING):  chlorhexidine 0.12% Liquid 15 milliLiter(s) Oral Mucosa every 12 hours  chlorhexidine 2% Cloths 1 Application(s) Topical daily  fentaNYL   Infusion. 1.5 MICROgram(s)/kG/Hr (7.86 mL/Hr) IV Continuous <Continuous>  heparin  Infusion. 800 Unit(s)/Hr (8 mL/Hr) IV Continuous <Continuous>  hydrocortisone sodium succinate Injectable 50 milliGRAM(s) IV Push every 6 hours  ketamine Infusion. 1 mG/kG/Hr (5.24 mL/Hr) IV Continuous <Continuous>  lactated ringers. 1000 milliLiter(s) (100 mL/Hr) IV Continuous <Continuous>  norepinephrine Infusion 0.12 MICROgram(s)/kG/Min (11.8 mL/Hr) IV Continuous <Continuous>  piperacillin/tazobactam IVPB.. 3.375 Gram(s) IV Intermittent every 8 hours  thiamine IVPB 500 milliGRAM(s) IV Intermittent every 8 hours  vasopressin Infusion 0.04 Unit(s)/Min (6 mL/Hr) IV Continuous <Continuous>    MEDICATIONS  (PRN):  heparin   Injectable 4000 Unit(s) IV Push every 6 hours PRN For aPTT less than 40  heparin   Injectable 2000 Unit(s) IV Push every 6 hours PRN For aPTT between 40 - 57  HYDROmorphone  Injectable 0.5 milliGRAM(s) IV Push every 3 hours PRN Severe Pain (7 - 10)  sodium chloride 0.9% lock flush 10 milliLiter(s) IV Push every 1 hour PRN Pre/post blood products, medications, blood draw, and to maintain line patency      Physical Exam  Constitutional: patient resting comfortably in bed, in no acute distress  HEENT: EOMI, MMM  Respiratory: intubated, ventilated  Cardiovascular: RRR  Gastrointestinal: abdomen soft, tender, wound vac in place  : avila in place, mckeon urine in bag  Musculoskeletal: no joint pain, swelling or deformity; no limitation of movement  Vascular: extremities warm and well perfused  Integument: no wounds noted    LABS:                        8.7    30.22 )-----------( 256      ( 07 May 2024 03:30 )             26.7     05-07    136  |  103  |  7.6<L>  ----------------------------<  64<L>  3.5   |  18.0<L>  |  0.57    Ca    7.6<L>      07 May 2024 03:30  Phos  2.8     05-07  Mg     2.1     05-07    TPro  3.9<L>  /  Alb  1.8<L>  /  TBili  0.3<L>  /  DBili  0.2  /  AST  42<H>  /  ALT  10  /  AlkPhos  50  05-07    PT/INR - ( 07 May 2024 03:30 )   PT: 19.4 sec;   INR: 1.78 ratio         PTT - ( 07 May 2024 03:30 )  PTT:93.4 sec  Urinalysis Basic - ( 07 May 2024 03:30 )      Assessment:  62yo Female h/o chronic distal aorta occlusion presented with stabbing abdominal pain and CT demonstrating mesenteric ischemia. POD 1 s/p ex lap, retrograde SMA angiography, angioplasty with stenting, and small bowel resection. Left in discontinuity for resuscitation. Plan for ACS to RTOR today for second look and closure.    Plan  -   - DVT ppx:   - Dispo:     FABIENNE Osorio MD HPI/Overnight Events:   Patient examined at bedside this AM. POD 2 s/p ex lap, retrograde SMA angiogram, angioplasty and stenting with ACS performing bowel resection. Remains intubated, off sedation.    Vital Signs Last 24 Hrs  T(C): 37.8 (07 May 2024 06:30), Max: 38.1 (06 May 2024 12:30)  T(F): 100 (07 May 2024 06:30), Max: 100.6 (06 May 2024 12:30)  HR: 77 (07 May 2024 06:30) (63 - 88)  BP: 133/70 (07 May 2024 06:00) (91/58 - 133/70)  BP(mean): 87 (07 May 2024 06:00) (66 - 89)  RR: 16 (07 May 2024 06:30) (12 - 26)  SpO2: 100% (07 May 2024 06:00) (95% - 100%)    Parameters below as of 07 May 2024 00:00  Patient On (Oxygen Delivery Method): ventilator        I&O's Detail    05 May 2024 07:01  -  06 May 2024 07:00  --------------------------------------------------------  IN:    FentaNYL: 7.8 mL    Heparin Infusion: 28 mL    IV PiggyBack: 100 mL    IV PiggyBack: 100 mL    IV PiggyBack: 100 mL    Ketamine: 3.9 mL    Lactated Ringers: 400 mL    multiple electrolytes Injection Type 1 Bolus: 100 mL    Norepinephrine: 25.9 mL    Vasopressin: 24 mL  Total IN: 889.6 mL    OUT:    Indwelling Catheter - Urethral (mL): 385 mL    Nasogastric/Oral tube (mL): 100 mL    VAC (Vacuum Assisted Closure) System (mL): 200 mL  Total OUT: 685 mL    Total NET: 204.6 mL      06 May 2024 07:01  -  07 May 2024 06:34  --------------------------------------------------------  IN:    Albumin 25%  -  50 mL: 100 mL    FentaNYL: 15.7 mL    FentaNYL: 173.1 mL    Heparin Infusion: 8 mL    Heparin Infusion: 152 mL    IV PiggyBack: 50 mL    IV PiggyBack: 100 mL    IV PiggyBack: 250 mL    IV PiggyBack: 275 mL    IV PiggyBack: 200 mL    Ketamine: 9.1 mL    Ketamine: 221.7 mL    Lactated Ringers: 2000 mL    multiple electrolytes Injection Type 1 Bolus: 2000 mL    Norepinephrine: 91.5 mL    Vasopressin: 54 mL  Total IN: 5700.1 mL    OUT:    Indwelling Catheter - Urethral (mL): 540 mL    Nasogastric/Oral tube (mL): 130 mL    VAC (Vacuum Assisted Closure) System (mL): 300 mL  Total OUT: 970 mL    Total NET: 4730.1 mL          MEDICATIONS  (STANDING):  chlorhexidine 0.12% Liquid 15 milliLiter(s) Oral Mucosa every 12 hours  chlorhexidine 2% Cloths 1 Application(s) Topical daily  fentaNYL   Infusion. 1.5 MICROgram(s)/kG/Hr (7.86 mL/Hr) IV Continuous <Continuous>  heparin  Infusion. 800 Unit(s)/Hr (8 mL/Hr) IV Continuous <Continuous>  hydrocortisone sodium succinate Injectable 50 milliGRAM(s) IV Push every 6 hours  ketamine Infusion. 1 mG/kG/Hr (5.24 mL/Hr) IV Continuous <Continuous>  lactated ringers. 1000 milliLiter(s) (100 mL/Hr) IV Continuous <Continuous>  norepinephrine Infusion 0.12 MICROgram(s)/kG/Min (11.8 mL/Hr) IV Continuous <Continuous>  piperacillin/tazobactam IVPB.. 3.375 Gram(s) IV Intermittent every 8 hours  thiamine IVPB 500 milliGRAM(s) IV Intermittent every 8 hours  vasopressin Infusion 0.04 Unit(s)/Min (6 mL/Hr) IV Continuous <Continuous>    MEDICATIONS  (PRN):  heparin   Injectable 4000 Unit(s) IV Push every 6 hours PRN For aPTT less than 40  heparin   Injectable 2000 Unit(s) IV Push every 6 hours PRN For aPTT between 40 - 57  HYDROmorphone  Injectable 0.5 milliGRAM(s) IV Push every 3 hours PRN Severe Pain (7 - 10)  sodium chloride 0.9% lock flush 10 milliLiter(s) IV Push every 1 hour PRN Pre/post blood products, medications, blood draw, and to maintain line patency      Physical Exam  Constitutional: patient resting comfortably in bed, in no acute distress  HEENT: EOMI, MMM  Respiratory: intubated, ventilated  Cardiovascular: RRR  Gastrointestinal: abdomen soft, tender, wound vac in place  : avila in place, mckeon urine in bag  Musculoskeletal: no joint pain, swelling or deformity; no limitation of movement  Vascular: extremities warm and well perfused  Integument: no wounds noted    LABS:                        8.7    30.22 )-----------( 256      ( 07 May 2024 03:30 )             26.7     05-07    136  |  103  |  7.6<L>  ----------------------------<  64<L>  3.5   |  18.0<L>  |  0.57    Ca    7.6<L>      07 May 2024 03:30  Phos  2.8     05-07  Mg     2.1     05-07    TPro  3.9<L>  /  Alb  1.8<L>  /  TBili  0.3<L>  /  DBili  0.2  /  AST  42<H>  /  ALT  10  /  AlkPhos  50  05-07    PT/INR - ( 07 May 2024 03:30 )   PT: 19.4 sec;   INR: 1.78 ratio         PTT - ( 07 May 2024 03:30 )  PTT:93.4 sec  Urinalysis Basic - ( 07 May 2024 03:30 )      Assessment:  64yo Female h/o chronic distal aorta occlusion presented with stabbing abdominal pain and CT demonstrating mesenteric ischemia. POD 1 s/p ex lap, retrograde SMA angiography, angioplasty with stenting, and small bowel resection. Left in discontinuity for resuscitation. Plan for return to OR with ACS.    Plan  - Start antiplatelet therapy once PO  - Vascular will follow    FABIENNE Osorio MD

## 2024-05-07 NOTE — DIETITIAN INITIAL EVALUATION ADULT - PERTINENT MEDS FT
MEDICATIONS  (STANDING):  albumin human 25% IVPB 100 milliLiter(s) IV Intermittent every 4 hours  chlorhexidine 2% Cloths 1 Application(s) Topical daily  fentaNYL   Infusion. 1.5 MICROgram(s)/kG/Hr (7.86 mL/Hr) IV Continuous <Continuous>  heparin  Infusion. 800 Unit(s)/Hr (8 mL/Hr) IV Continuous <Continuous>  hydrocortisone sodium succinate Injectable 50 milliGRAM(s) IV Push every 6 hours  ketamine Infusion. 1 mG/kG/Hr (5.24 mL/Hr) IV Continuous <Continuous>  norepinephrine Infusion 0.12 MICROgram(s)/kG/Min (11.8 mL/Hr) IV Continuous <Continuous>  piperacillin/tazobactam IVPB.. 3.375 Gram(s) IV Intermittent every 8 hours  thiamine IVPB 500 milliGRAM(s) IV Intermittent every 8 hours  vasopressin Infusion 0.04 Unit(s)/Min (6 mL/Hr) IV Continuous <Continuous>    MEDICATIONS  (PRN):  heparin   Injectable 2000 Unit(s) IV Push every 6 hours PRN For aPTT between 40 - 57  heparin   Injectable 4000 Unit(s) IV Push every 6 hours PRN For aPTT less than 40  HYDROmorphone  Injectable 0.5 milliGRAM(s) IV Push every 3 hours PRN Severe Pain (7 - 10)  sodium chloride 0.9% lock flush 10 milliLiter(s) IV Push every 1 hour PRN Pre/post blood products, medications, blood draw, and to maintain line patency

## 2024-05-07 NOTE — BRIEF OPERATIVE NOTE - OPERATION/FINDINGS
Abthera removed. Bowel interrogated from LOT to TI. No evidence of bowel ischemia.  SMA strong and palpable.   Two ends of jejunum left in discontinuity was reanastomosed using a COLETTE stapler (~ 5cm removed), one limb concerning after anastomosis, and an additional 5cm resected.   Common channel patent.   Additional 240 cm of bowel remained.     Intra-op XR with no foreign materials. NGT confirmed.     See operative dictation for additional findings.   Abthera removed. Bowel interrogated from LOT to TI. No evidence of bowel ischemia.  SMA pulse strong and palpable.   Two ends of jejunum left in discontinuity was reanastomosed using a COLETTE stapler (~ 5cm removed), one limb concerning after anastomosis, and an additional 5cm resected.   Common channel patent.   Additional 220 cm of bowel remained.     Intra-op XR with no foreign materials. NGT confirmed.     See operative dictation for additional findings.

## 2024-05-07 NOTE — PROGRESS NOTE ADULT - SUBJECTIVE AND OBJECTIVE BOX
SICU Attending Progress Note    24H Events:    Patient seen and examined at bedside.  She appears better, downtrending vasopresssors.  S/p 1L overnight with improvement in hemodynamics and UOP.  Planned for abdominal re-exploration today.          albumin human 25% IVPB 100 milliLiter(s) IV Intermittent every 4 hours  chlorhexidine 0.12% Liquid 15 milliLiter(s) Oral Mucosa every 12 hours  chlorhexidine 2% Cloths 1 Application(s) Topical daily  fentaNYL   Infusion. 1.5 MICROgram(s)/kG/Hr IV Continuous <Continuous>  heparin   Injectable 4000 Unit(s) IV Push every 6 hours PRN  heparin   Injectable 2000 Unit(s) IV Push every 6 hours PRN  heparin  Infusion. 800 Unit(s)/Hr IV Continuous <Continuous>  hydrocortisone sodium succinate Injectable 50 milliGRAM(s) IV Push every 6 hours  HYDROmorphone  Injectable 0.5 milliGRAM(s) IV Push every 3 hours PRN  ketamine Infusion. 1 mG/kG/Hr IV Continuous <Continuous>  norepinephrine Infusion 0.12 MICROgram(s)/kG/Min IV Continuous <Continuous>  piperacillin/tazobactam IVPB.. 3.375 Gram(s) IV Intermittent every 8 hours  sodium chloride 0.9% lock flush 10 milliLiter(s) IV Push every 1 hour PRN  thiamine IVPB 500 milliGRAM(s) IV Intermittent every 8 hours  vasopressin Infusion 0.04 Unit(s)/Min IV Continuous <Continuous>    T(C): 36.9 (05-07-24 @ 12:51), Max: 38.1 (05-06-24 @ 13:30)  HR: 65 (05-07-24 @ 12:51) (63 - 88)  BP: 116/91 (05-07-24 @ 12:00) (91/58 - 133/70)  RR: 18 (05-07-24 @ 12:51) (12 - 25)  SpO2: 100% (05-07-24 @ 12:51) (95% - 100%)  Mode: AC/ CMV (Assist Control/ Continuous Mandatory Ventilation), RR (machine): 18, TV (machine): 420, FiO2: 40, PEEP: 6, ITime: 1, MAP: 10, PIP: 19    05-06-24 @ 07:01  -  05-07-24 @ 07:00  --------------------------------------------------------  IN: 6430.7 mL / OUT: 2115 mL / NET: 4315.7 mL    05-07-24 @ 07:01  -  05-07-24 @ 13:15  --------------------------------------------------------  IN: 410.5 mL / OUT: 140 mL / NET: 270.5 mL      PHYSICAL EXAM:  GENERAL:  Resting in bed  HEENT:  Head atraumatic, EOMI, PERRLA, conjunctiva and sclera clear; Moist mucous membranes, normal oropharynx  NECK: Supple, No JVD, no lymphadenopathy, no thyroid nodules or enlargement  CHEST/LUNG: Clear to auscultation bilaterally; No rales, rhonchi, wheezing, or rubs. On mechanical ventilation.    HEART: Regular rate and rhythm; No murmurs, rubs, or gallops  ABDOMEN: Abdomen open with ABThera in place, serosanguinous output.  EXTREMITIES:  2+ Peripheral Pulses, brisk capillary refill. No clubbing, cyanosis, or edema  NERVOUS SYSTEM:  Alert & Oriented X3, non-focal and spontaneous movements of all extremities  SKIN: No rashes or lesions        Assesment:63yFemale with severe diffuse PAD presents with abdominal pain, leukocytosis, and pneumoperitoneum, now s/p exploratory laparotomy with SBR.  Patient in septic shock, intubated, and on vasopressors.  Improving well with improved hemodynamics.  Planned for re-exploration today.      Plan:    Neuro: On Ketamine GTT and Fentanyl GTT, and Dilaudid for breakthrough.  Patient has extensive prior narcotic usage, and is on unprescribed Suboxone at home.    Pulm: Intubated, on mechanical ventilation.  ETT in good position per CXR.  ABGs showing no hypoxia at this time.  Will attempt daily SAT and possible SBT when she's ready for extubation.  Card: On Vasopressin GTTs for hypotension likely due to septic shock, weaning down.  ECHO from 12/2023 shows EF 40%, ECHO pending.  Troponins WNL.  GI: NPO, NGT to LIS.  Bowel in discontinuity, will plan for anastomosis when patient is more HDS.  Endo: BG monitoring, ISS coverage. SDS on board Hydrocortisone 50 Q6H.   Renal: Roberts in place with appropriate, UOP appropriate and improved with bolus.  ID: piperacillin/tazobactam IVPB.. 3.375 Gram(s) IV Intermittent every 8 hours  Trend WBC, cultures pending.  Heme: Trend Hgb, transfuse PRN.  Tubes/Lines: Roberts, ETT, TLC, Arterial line.  Dispo/Code Status: Continued SICU care

## 2024-05-07 NOTE — BRIEF OPERATIVE NOTE - NSICDXBRIEFPREOP_GEN_ALL_CORE_FT
PRE-OP DIAGNOSIS:  Mesenteric ischemia 06-May-2024 03:06:54  Oumou Morton  
PRE-OP DIAGNOSIS:  Mesenteric ischemia 06-May-2024 03:06:54  Oumou Morton  S/P exploratory laparotomy 07-May-2024 16:19:35 with abthera placement Natasha Mejia  
PRE-OP DIAGNOSIS:  Mesenteric ischemia 06-May-2024 03:06:54  Oumou Morton

## 2024-05-07 NOTE — DIETITIAN INITIAL EVALUATION ADULT - ETIOLOGY
Related to mesenteric ischemia with intestinal perforation, exploratory laparotomy and bowel resection

## 2024-05-07 NOTE — DIETITIAN INITIAL EVALUATION ADULT - NSFNSGIIOFT_GEN_A_CORE
05-06-24 @ 07:01  -  05-07-24 @ 07:00  --------------------------------------------------------  OUT:    Nasogastric/Oral tube (mL): 530 mL  Total OUT: 530 mL    Total NET: -530 mL

## 2024-05-07 NOTE — BRIEF OPERATIVE NOTE - COMMENTS
Bedside sign out performed with all team members present.   Zosyn 4 days   Start hep gtt in 6 hours   Start antiplatelet tx on 5/8, rectal ASA until PO   Transition to DOAC on D/C   Keep prevena in place until POD 4/5

## 2024-05-07 NOTE — BRIEF OPERATIVE NOTE - NSICDXBRIEFPOSTOP_GEN_ALL_CORE_FT
POST-OP DIAGNOSIS:  Mesenteric ischemia 07-May-2024 16:20:06 resolved, no evidence of bowel ischemia Natasha Mejia

## 2024-05-07 NOTE — BRIEF OPERATIVE NOTE - NSICDXBRIEFPROCEDURE_GEN_ALL_CORE_FT
PROCEDURES:  Laparotomy, exploratory, adult 06-May-2024 03:06:29  Oumou Morton  Small bowel resection 06-May-2024 03:06:39  Oumou Morton  
PROCEDURES:  Angioplasty, artery, superior mesenteric 06-May-2024 03:13:56  Oumou Morton  Insertion, stent, artery, superior mesenteric 06-May-2024 03:14:13  Oumou Morton  
PROCEDURES:  Laparotomy, exploratory, adult 06-May-2024 03:06:29 re-exploration of abdomen Ardenet, Oumou  Small bowel resection 06-May-2024 03:06:39 with primary anastamosis Jaison Mortonn

## 2024-05-08 ENCOUNTER — RESULT REVIEW (OUTPATIENT)
Age: 64
End: 2024-05-08

## 2024-05-08 LAB
ACANTHOCYTES BLD QL SMEAR: SIGNIFICANT CHANGE UP
ANION GAP SERPL CALC-SCNC: 14 MMOL/L — SIGNIFICANT CHANGE UP (ref 5–17)
ANISOCYTOSIS BLD QL: SIGNIFICANT CHANGE UP
APTT BLD: 65.8 SEC — HIGH (ref 24.5–35.6)
APTT BLD: 65.9 SEC — HIGH (ref 24.5–35.6)
BASOPHILS # BLD AUTO: 0.04 K/UL — SIGNIFICANT CHANGE UP (ref 0–0.2)
BASOPHILS NFR BLD AUTO: 0.2 % — SIGNIFICANT CHANGE UP (ref 0–2)
BUN SERPL-MCNC: 10.9 MG/DL — SIGNIFICANT CHANGE UP (ref 8–20)
BURR CELLS BLD QL SMEAR: PRESENT — SIGNIFICANT CHANGE UP
CALCIUM SERPL-MCNC: 7.6 MG/DL — LOW (ref 8.4–10.5)
CHLORIDE SERPL-SCNC: 109 MMOL/L — HIGH (ref 96–108)
CO2 SERPL-SCNC: 21 MMOL/L — LOW (ref 22–29)
CREAT SERPL-MCNC: 0.42 MG/DL — LOW (ref 0.5–1.3)
EGFR: 111 ML/MIN/1.73M2 — SIGNIFICANT CHANGE UP
ELLIPTOCYTES BLD QL SMEAR: SLIGHT — SIGNIFICANT CHANGE UP
EOSINOPHIL # BLD AUTO: 0 K/UL — SIGNIFICANT CHANGE UP (ref 0–0.5)
EOSINOPHIL NFR BLD AUTO: 0 % — SIGNIFICANT CHANGE UP (ref 0–6)
GAS PNL BLDA: SIGNIFICANT CHANGE UP
GAS PNL BLDA: SIGNIFICANT CHANGE UP
GLUCOSE SERPL-MCNC: 90 MG/DL — SIGNIFICANT CHANGE UP (ref 70–99)
HCT VFR BLD CALC: 22.6 % — LOW (ref 34.5–45)
HGB BLD-MCNC: 7.5 G/DL — LOW (ref 11.5–15.5)
IMM GRANULOCYTES NFR BLD AUTO: 1.8 % — HIGH (ref 0–0.9)
INR BLD: 1.89 RATIO — HIGH (ref 0.85–1.18)
LYMPHOCYTES # BLD AUTO: 1.15 K/UL — SIGNIFICANT CHANGE UP (ref 1–3.3)
LYMPHOCYTES # BLD AUTO: 4.4 % — LOW (ref 13–44)
MACROCYTES BLD QL: SLIGHT — SIGNIFICANT CHANGE UP
MAGNESIUM SERPL-MCNC: 2.1 MG/DL — SIGNIFICANT CHANGE UP (ref 1.6–2.6)
MANUAL SMEAR VERIFICATION: SIGNIFICANT CHANGE UP
MCHC RBC-ENTMCNC: 19.1 PG — LOW (ref 27–34)
MCHC RBC-ENTMCNC: 33.2 GM/DL — SIGNIFICANT CHANGE UP (ref 32–36)
MCV RBC AUTO: 57.5 FL — LOW (ref 80–100)
MICROCYTES BLD QL: SLIGHT — SIGNIFICANT CHANGE UP
MONOCYTES # BLD AUTO: 0.68 K/UL — SIGNIFICANT CHANGE UP (ref 0–0.9)
MONOCYTES NFR BLD AUTO: 2.6 % — SIGNIFICANT CHANGE UP (ref 2–14)
NEUTROPHILS # BLD AUTO: 23.84 K/UL — HIGH (ref 1.8–7.4)
NEUTROPHILS NFR BLD AUTO: 91 % — HIGH (ref 43–77)
OVALOCYTES BLD QL SMEAR: SLIGHT — SIGNIFICANT CHANGE UP
PHOSPHATE SERPL-MCNC: 3 MG/DL — SIGNIFICANT CHANGE UP (ref 2.4–4.7)
PLAT MORPH BLD: NORMAL — SIGNIFICANT CHANGE UP
PLATELET # BLD AUTO: 238 K/UL — SIGNIFICANT CHANGE UP (ref 150–400)
POIKILOCYTOSIS BLD QL AUTO: SIGNIFICANT CHANGE UP
POTASSIUM SERPL-MCNC: 3.8 MMOL/L — SIGNIFICANT CHANGE UP (ref 3.5–5.3)
POTASSIUM SERPL-SCNC: 3.8 MMOL/L — SIGNIFICANT CHANGE UP (ref 3.5–5.3)
PROTHROM AB SERPL-ACNC: 20.6 SEC — HIGH (ref 9.5–13)
RBC # BLD: 3.93 M/UL — SIGNIFICANT CHANGE UP (ref 3.8–5.2)
RBC # FLD: 19.2 % — HIGH (ref 10.3–14.5)
RBC BLD AUTO: ABNORMAL
SCHISTOCYTES BLD QL AUTO: SLIGHT — SIGNIFICANT CHANGE UP
SODIUM SERPL-SCNC: 143 MMOL/L — SIGNIFICANT CHANGE UP (ref 135–145)
TARGETS BLD QL SMEAR: SIGNIFICANT CHANGE UP
WBC # BLD: 26.19 K/UL — HIGH (ref 3.8–10.5)
WBC # FLD AUTO: 26.19 K/UL — HIGH (ref 3.8–10.5)

## 2024-05-08 PROCEDURE — 71045 X-RAY EXAM CHEST 1 VIEW: CPT | Mod: 26,77

## 2024-05-08 PROCEDURE — 99291 CRITICAL CARE FIRST HOUR: CPT

## 2024-05-08 PROCEDURE — 71045 X-RAY EXAM CHEST 1 VIEW: CPT | Mod: 26

## 2024-05-08 PROCEDURE — 93010 ELECTROCARDIOGRAM REPORT: CPT

## 2024-05-08 PROCEDURE — 93306 TTE W/DOPPLER COMPLETE: CPT | Mod: 26

## 2024-05-08 RX ORDER — HYDROMORPHONE HYDROCHLORIDE 2 MG/ML
0.2 INJECTION INTRAMUSCULAR; INTRAVENOUS; SUBCUTANEOUS
Refills: 0 | Status: DISCONTINUED | OUTPATIENT
Start: 2024-05-08 | End: 2024-05-09

## 2024-05-08 RX ORDER — IPRATROPIUM/ALBUTEROL SULFATE 18-103MCG
3 AEROSOL WITH ADAPTER (GRAM) INHALATION ONCE
Refills: 0 | Status: COMPLETED | OUTPATIENT
Start: 2024-05-08 | End: 2024-05-08

## 2024-05-08 RX ORDER — CARVEDILOL PHOSPHATE 80 MG/1
1 CAPSULE, EXTENDED RELEASE ORAL
Refills: 0 | DISCHARGE

## 2024-05-08 RX ORDER — BUDESONIDE AND FORMOTEROL FUMARATE DIHYDRATE 160; 4.5 UG/1; UG/1
2 AEROSOL RESPIRATORY (INHALATION)
Refills: 0 | Status: DISCONTINUED | OUTPATIENT
Start: 2024-05-08 | End: 2024-05-10

## 2024-05-08 RX ORDER — APIXABAN 2.5 MG/1
1 TABLET, FILM COATED ORAL
Refills: 0 | DISCHARGE

## 2024-05-08 RX ORDER — HALOPERIDOL DECANOATE 100 MG/ML
5 INJECTION INTRAMUSCULAR ONCE
Refills: 0 | Status: COMPLETED | OUTPATIENT
Start: 2024-05-08 | End: 2024-05-08

## 2024-05-08 RX ORDER — ATORVASTATIN CALCIUM 80 MG/1
1 TABLET, FILM COATED ORAL
Refills: 0 | DISCHARGE

## 2024-05-08 RX ORDER — FUROSEMIDE 40 MG
1 TABLET ORAL
Refills: 0 | DISCHARGE

## 2024-05-08 RX ORDER — ALBUTEROL 90 UG/1
2 AEROSOL, METERED ORAL
Refills: 0 | DISCHARGE

## 2024-05-08 RX ORDER — GABAPENTIN 400 MG/1
1 CAPSULE ORAL
Refills: 0 | DISCHARGE

## 2024-05-08 RX ORDER — ASPIRIN/CALCIUM CARB/MAGNESIUM 324 MG
0 TABLET ORAL
Refills: 0 | DISCHARGE

## 2024-05-08 RX ORDER — PANTOPRAZOLE SODIUM 20 MG/1
1 TABLET, DELAYED RELEASE ORAL
Refills: 0 | DISCHARGE

## 2024-05-08 RX ORDER — CLOPIDOGREL BISULFATE 75 MG/1
1 TABLET, FILM COATED ORAL
Refills: 0 | DISCHARGE

## 2024-05-08 RX ORDER — ASPIRIN/CALCIUM CARB/MAGNESIUM 324 MG
300 TABLET ORAL DAILY
Refills: 0 | Status: DISCONTINUED | OUTPATIENT
Start: 2024-05-08 | End: 2024-05-10

## 2024-05-08 RX ORDER — IPRATROPIUM/ALBUTEROL SULFATE 18-103MCG
3 AEROSOL WITH ADAPTER (GRAM) INHALATION EVERY 6 HOURS
Refills: 0 | Status: DISCONTINUED | OUTPATIENT
Start: 2024-05-08 | End: 2024-05-10

## 2024-05-08 RX ORDER — SPIRONOLACTONE 25 MG/1
1 TABLET, FILM COATED ORAL
Refills: 0 | DISCHARGE

## 2024-05-08 RX ORDER — HYDROMORPHONE HYDROCHLORIDE 2 MG/ML
0.5 INJECTION INTRAMUSCULAR; INTRAVENOUS; SUBCUTANEOUS ONCE
Refills: 0 | Status: DISCONTINUED | OUTPATIENT
Start: 2024-05-08 | End: 2024-05-08

## 2024-05-08 RX ORDER — BUDESONIDE AND FORMOTEROL FUMARATE DIHYDRATE 160; 4.5 UG/1; UG/1
2 AEROSOL RESPIRATORY (INHALATION)
Refills: 0 | DISCHARGE

## 2024-05-08 RX ORDER — LOSARTAN POTASSIUM 100 MG/1
1 TABLET, FILM COATED ORAL
Refills: 0 | DISCHARGE

## 2024-05-08 RX ORDER — QUETIAPINE FUMARATE 200 MG/1
1 TABLET, FILM COATED ORAL
Refills: 0 | DISCHARGE

## 2024-05-08 RX ADMIN — Medication 3 MILLILITER(S): at 17:11

## 2024-05-08 RX ADMIN — PANTOPRAZOLE SODIUM 40 MILLIGRAM(S): 20 TABLET, DELAYED RELEASE ORAL at 11:00

## 2024-05-08 RX ADMIN — Medication 50 MILLIGRAM(S): at 00:15

## 2024-05-08 RX ADMIN — Medication 50 MILLILITER(S): at 05:18

## 2024-05-08 RX ADMIN — CHLORHEXIDINE GLUCONATE 1 APPLICATION(S): 213 SOLUTION TOPICAL at 11:00

## 2024-05-08 RX ADMIN — HYDROMORPHONE HYDROCHLORIDE 0.5 MILLIGRAM(S): 2 INJECTION INTRAMUSCULAR; INTRAVENOUS; SUBCUTANEOUS at 20:28

## 2024-05-08 RX ADMIN — Medication 105 MILLIGRAM(S): at 06:35

## 2024-05-08 RX ADMIN — Medication 50 MILLILITER(S): at 02:45

## 2024-05-08 RX ADMIN — SODIUM CHLORIDE 100 MILLILITER(S): 9 INJECTION, SOLUTION INTRAVENOUS at 14:13

## 2024-05-08 RX ADMIN — Medication 300 MILLIGRAM(S): at 14:13

## 2024-05-08 RX ADMIN — HYDROMORPHONE HYDROCHLORIDE 0.5 MILLIGRAM(S): 2 INJECTION INTRAMUSCULAR; INTRAVENOUS; SUBCUTANEOUS at 22:29

## 2024-05-08 RX ADMIN — HALOPERIDOL DECANOATE 5 MILLIGRAM(S): 100 INJECTION INTRAMUSCULAR at 19:35

## 2024-05-08 RX ADMIN — Medication 50 MILLIGRAM(S): at 18:05

## 2024-05-08 RX ADMIN — HYDROMORPHONE HYDROCHLORIDE 0.5 MILLIGRAM(S): 2 INJECTION INTRAMUSCULAR; INTRAVENOUS; SUBCUTANEOUS at 23:38

## 2024-05-08 RX ADMIN — PIPERACILLIN AND TAZOBACTAM 25 GRAM(S): 4; .5 INJECTION, POWDER, LYOPHILIZED, FOR SOLUTION INTRAVENOUS at 05:18

## 2024-05-08 RX ADMIN — Medication 105 MILLIGRAM(S): at 13:51

## 2024-05-08 RX ADMIN — HYDROMORPHONE HYDROCHLORIDE 0.5 MILLIGRAM(S): 2 INJECTION INTRAMUSCULAR; INTRAVENOUS; SUBCUTANEOUS at 06:46

## 2024-05-08 RX ADMIN — CHLORHEXIDINE GLUCONATE 15 MILLILITER(S): 213 SOLUTION TOPICAL at 05:17

## 2024-05-08 RX ADMIN — Medication 50 MILLILITER(S): at 13:51

## 2024-05-08 RX ADMIN — Medication 3 MILLILITER(S): at 21:35

## 2024-05-08 RX ADMIN — HYDROMORPHONE HYDROCHLORIDE 0.5 MILLIGRAM(S): 2 INJECTION INTRAMUSCULAR; INTRAVENOUS; SUBCUTANEOUS at 09:17

## 2024-05-08 RX ADMIN — BUDESONIDE AND FORMOTEROL FUMARATE DIHYDRATE 2 PUFF(S): 160; 4.5 AEROSOL RESPIRATORY (INHALATION) at 21:41

## 2024-05-08 RX ADMIN — PIPERACILLIN AND TAZOBACTAM 25 GRAM(S): 4; .5 INJECTION, POWDER, LYOPHILIZED, FOR SOLUTION INTRAVENOUS at 22:12

## 2024-05-08 RX ADMIN — HEPARIN SODIUM 8 UNIT(S)/HR: 5000 INJECTION INTRAVENOUS; SUBCUTANEOUS at 14:13

## 2024-05-08 RX ADMIN — PIPERACILLIN AND TAZOBACTAM 25 GRAM(S): 4; .5 INJECTION, POWDER, LYOPHILIZED, FOR SOLUTION INTRAVENOUS at 14:12

## 2024-05-08 RX ADMIN — HYDROMORPHONE HYDROCHLORIDE 0.5 MILLIGRAM(S): 2 INJECTION INTRAMUSCULAR; INTRAVENOUS; SUBCUTANEOUS at 07:00

## 2024-05-08 RX ADMIN — HYDROMORPHONE HYDROCHLORIDE 0.5 MILLIGRAM(S): 2 INJECTION INTRAMUSCULAR; INTRAVENOUS; SUBCUTANEOUS at 23:45

## 2024-05-08 RX ADMIN — HYDROMORPHONE HYDROCHLORIDE 0.5 MILLIGRAM(S): 2 INJECTION INTRAMUSCULAR; INTRAVENOUS; SUBCUTANEOUS at 09:35

## 2024-05-08 RX ADMIN — Medication 50 MILLILITER(S): at 09:06

## 2024-05-08 NOTE — PROGRESS NOTE ADULT - ATTENDING COMMENTS
Patient with acute mesenteric ischemia s/p open retrograde mesenteric stent. Now abdomen is closed recommend asa and doac on discharge

## 2024-05-08 NOTE — PROGRESS NOTE ADULT - ASSESSMENT
-Neuro: dilaudid, takes partner's suboxone at home, will consult pain service  -Breathing comfortably on humidified O2 now on 98%, OOB to chair, PT eval  -Outpatient EF w/EF 40-50%, then transiently down to 25-30% on 5/6 - now off vasopressors and HD stable, will repeat DAMASO  -NPO, NGT -pulled out by patient earlier this am, only put out 50cc overnight  -H/H slightly decreased but adequate, follow trend, remains on heparin drip-therapeutic, ASA likely [place] as patient NPO currently  -Cr improved, adequate urine output, although rate did drop off over last hour or so -but adequate - will continue to follow and continue w/LR at 100, d/c avila  -Stress dose steroids now being weaned off   -Afebrile, WBC increased from yesterday but overall improved  -PIV, RIJ cordis- to be removed    -Full code     Patient is a 63 year old female w/hx of PAD, carotid stenosis, distal aortic occlusion, renal artery stenosis s/o L renal stent, MI, tobacco use, DVT on eliquis who was admitted on 5/5/24 w/1 month hx of progressive abdominal pain.  Taken to OR (5/5) and underwent an ex lap, excision of 10cm small bowel, SMA stenting, angiogram.  Left in discontinuity in setting of mesenteric ischemia.  5/6 underwent an SMA angioplasty w/SBR and again left in discontinuity.  5/7 patient underwent the third ex lap w/subsequent additional 5cm small bowel resection w/anastomosis.  Extubated this am while in SICU.    -Neuro: dilaudid, takes partner's suboxone at home, will consult pain service for help guiding the patient's complicated multimodal pain reigmen  -Breathing comfortably on humidified O2 now on 98%, OOB to chair, PT eval  -Outpatient EF w/EF 40-50%, then transiently down to 25-30% on 5/6 - now off vasopressors and HD stable, will repeat DAMASO and expect/hope to see improvement in cardiac function  -NPO, NGT -pulled out by patient earlier this am, only put out 50cc overnight but still is mildly distended and has had several trips to OR - would expect an ileus  -H/H slightly decreased but adequate, follow trend, remains on heparin drip-therapeutic, ASA likely [place] as patient NPO currently  -Cr improved, adequate urine output, although rate did drop off over last hour or so -but adequate - will continue to follow and continue w/LR at 100, d/c avila  -Stress dose steroids now being weaned off   -Afebrile, WBC increased from yesterday but overall improved,   -PIV, RIJ cordis- to be removed    -Full code

## 2024-05-08 NOTE — PROGRESS NOTE ADULT - ASSESSMENT
Assessment:   62yo Female h/o chronic distal aorta occlusion presented with stabbing abdominal pain and CT demonstrating mesenteric ischemia. POD 2 s/p ex lap, retrograde SMA angiography, angioplasty with stenting, and small bowel resection and POD 1 s/p reanastomosis and abdominal closure. SMA evaluated intra op and found to have strong pulsatile flow. Remains intubated.    Plan:   - Recommend DOAC and plavix on dc  - Please call back with any further questions or concerns

## 2024-05-08 NOTE — PROGRESS NOTE ADULT - SUBJECTIVE AND OBJECTIVE BOX
INTERVAL HPI/OVERNIGHT EVENTS:    RTOR yesterday for 5cm small bowel resection and creation small bowel anastomosis.  (220cm remain)  Extubated this am.    SUBJECTIVE:      MEDICATIONS  (STANDING):  albumin human 25% IVPB 100 milliLiter(s) IV Intermittent every 4 hours  chlorhexidine 2% Cloths 1 Application(s) Topical daily  fentaNYL   Infusion 2 MICROgram(s)/kG/Hr (10.5 mL/Hr) IV Continuous <Continuous>  heparin  Infusion 800 Unit(s)/Hr (8 mL/Hr) IV Continuous <Continuous>  hydrocortisone sodium succinate Injectable 50 milliGRAM(s) IV Push every 12 hours  hydrocortisone sodium succinate Injectable   IV Push   ketamine Infusion. 2.5 mG/kG/Hr (13.1 mL/Hr) IV Continuous <Continuous>  lactated ringers. 1000 milliLiter(s) (100 mL/Hr) IV Continuous <Continuous>  pantoprazole  Injectable 40 milliGRAM(s) IV Push daily  piperacillin/tazobactam IVPB.. 3.375 Gram(s) IV Intermittent every 8 hours  thiamine IVPB 500 milliGRAM(s) IV Intermittent every 8 hours  vasopressin Infusion 0.04 Unit(s)/Min (6 mL/Hr) IV Continuous <Continuous>    MEDICATIONS  (PRN):  HYDROmorphone  Injectable 0.5 milliGRAM(s) IV Push every 3 hours PRN Severe Pain (7 - 10)  sodium chloride 0.9% lock flush 10 milliLiter(s) IV Push every 1 hour PRN Pre/post blood products, medications, blood draw, and to maintain line patency      Drug Dosing Weight  Height (cm): 170.2 (05 May 2024 20:47)  Weight (kg): 52.4 (05 May 2024 20:52)  BMI (kg/m2): 18.1 (05 May 2024 20:52)  BSA (m2): 1.6 (05 May 2024 20:52)    PAST MEDICAL & SURGICAL HISTORY:    ICU Vital Signs Last 24 Hrs  T(C): 37 (08 May 2024 06:30), Max: 37.1 (07 May 2024 11:00)  T(F): 98.6 (08 May 2024 06:30), Max: 98.8 (07 May 2024 11:00)  HR: 92 (08 May 2024 10:30) (56 - 93)  BP: 116/91 (07 May 2024 12:00) (114/60 - 116/91)  BP(mean): 100 (07 May 2024 12:00) (76 - 100)  ABP: 150/59 (08 May 2024 10:30) (106/45 - 158/62)  ABP(mean): 95 (08 May 2024 10:30) (67 - 101)  RR: 26 (08 May 2024 10:30) (12 - 30)  SpO2: 95% (08 May 2024 10:30) (94% - 100%)    O2 Parameters below as of 08 May 2024 09:15  Patient On (Oxygen Delivery Method): ventilator, 40%          ABG - ( 08 May 2024 03:57 )  pH, Arterial: 7.450 pH, Blood: x     /  pCO2: 35    /  pO2: 92    / HCO3: 24    / Base Excess: 0.3   /  SaO2: 99.6              I&O's Detail    07 May 2024 07:01  -  08 May 2024 07:00  --------------------------------------------------------  IN:    Albumin 25%  -  50 mL: 400 mL    FentaNYL: 52.5 mL    FentaNYL: 42 mL    Heparin: 80 mL    Heparin Infusion: 40 mL    IV PiggyBack: 498 mL    IV PiggyBack: 200 mL    IV PiggyBack: 125 mL    Ketamine: 72 mL    Ketamine: 138.1 mL    Lactated Ringers: 1400 mL    Vasopressin: 27 mL  Total IN: 3074.6 mL    OUT:    Indwelling Catheter - Urethral (mL): 1220 mL    Nasogastric/Oral tube (mL): 50 mL    Norepinephrine: 0 mL    VAC (Vacuum Assisted Closure) System (mL): 300 mL    Vasopressin: 0 mL  Total OUT: 1570 mL    Total NET: 1504.6 mL      08 May 2024 07:01  -  08 May 2024 10:49  --------------------------------------------------------  IN:    Albumin 25%  -  50 mL: 100 mL    Heparin: 24 mL    Lactated Ringers: 300 mL  Total IN: 424 mL    OUT:    FentaNYL: 0 mL    Indwelling Catheter - Urethral (mL): 80 mL    Ketamine: 0 mL    Vasopressin: 0 mL  Total OUT: 80 mL    Total NET: 344 mL        Mode: standby,Pt extubated...placed on 40% CAM...      Physical Exam:    Neurological:     HEENT:     Neck: Neck supple, No JVD    Respiratory:  Equal chest rise.  Breath Sounds equal bilateral    Cardiovascular:     Gastrointestinal:     Extremities:    Vascular:     Skin: No rashes    PATIENT CARE ACCESS DEVICES:  [ ] Peripheral IV  [ ] Central Venous Line	[ ] R	[ ] L	[ ] IJ	[ ] Fem	[ ] SC	Placed:   [ ] Arterial Line		[ ] R	[ ] L	[ ] Fem	[ ] Rad	[ ] Ax	Placed:   [ ] PICC:					[ ] Mediport  [ ] Urinary Catheter:   [ ] Necessity of urinary, arterial, and venous catheters discussed    LABS:  CBC Full  -  ( 08 May 2024 04:00 )  WBC Count : 26.19 K/uL  RBC Count : 3.93 M/uL  Hemoglobin : 7.5 g/dL  Hematocrit : 22.6 %  Platelet Count - Automated : 238 K/uL  Mean Cell Volume : 57.5 fl  Mean Cell Hemoglobin : 19.1 pg  Mean Cell Hemoglobin Concentration : 33.2 gm/dL  Auto Neutrophil # : 23.84 K/uL  Auto Lymphocyte # : 1.15 K/uL  Auto Monocyte # : 0.68 K/uL  Auto Eosinophil # : 0.00 K/uL  Auto Basophil # : 0.04 K/uL  Auto Neutrophil % : 91.0 %  Auto Lymphocyte % : 4.4 %  Auto Monocyte % : 2.6 %  Auto Eosinophil % : 0.0 %  Auto Basophil % : 0.2 %    05-08    143  |  109<H>  |  10.9  ----------------------------<  90  3.8   |  21.0<L>  |  0.42<L>    Ca    7.6<L>      08 May 2024 04:00  Phos  3.0     05-08  Mg     2.1     05-08    TPro  4.2<L>  /  Alb  2.8<L>  /  TBili  0.5  /  DBili  0.2  /  AST  113<H>  /  ALT  21  /  AlkPhos  45  05-07    PT/INR - ( 08 May 2024 04:00 )   PT: 20.6 sec;   INR: 1.89 ratio         PTT - ( 08 May 2024 04:00 )  PTT:65.8 sec  Urinalysis Basic - ( 08 May 2024 04:00 )    Color: x / Appearance: x / SG: x / pH: x  Gluc: 90 mg/dL / Ketone: x  / Bili: x / Urobili: x   Blood: x / Protein: x / Nitrite: x   Leuk Esterase: x / RBC: x / WBC x   Sq Epi: x / Non Sq Epi: x / Bacteria: x           INTERVAL HPI/OVERNIGHT EVENTS:    RTOR yesterday for 5cm small bowel resection and creation small bowel anastomosis.  (220cm remain)  Extubated this am.    SUBJECTIVE:  Not able to answer simple questions     MEDICATIONS  (STANDING):  albumin human 25% IVPB 100 milliLiter(s) IV Intermittent every 4 hours  chlorhexidine 2% Cloths 1 Application(s) Topical daily  fentaNYL   Infusion 2 MICROgram(s)/kG/Hr (10.5 mL/Hr) IV Continuous <Continuous>  heparin  Infusion 800 Unit(s)/Hr (8 mL/Hr) IV Continuous <Continuous>  hydrocortisone sodium succinate Injectable 50 milliGRAM(s) IV Push every 12 hours  hydrocortisone sodium succinate Injectable   IV Push   ketamine Infusion. 2.5 mG/kG/Hr (13.1 mL/Hr) IV Continuous <Continuous>  lactated ringers. 1000 milliLiter(s) (100 mL/Hr) IV Continuous <Continuous>  pantoprazole  Injectable 40 milliGRAM(s) IV Push daily  piperacillin/tazobactam IVPB.. 3.375 Gram(s) IV Intermittent every 8 hours  thiamine IVPB 500 milliGRAM(s) IV Intermittent every 8 hours  vasopressin Infusion 0.04 Unit(s)/Min (6 mL/Hr) IV Continuous <Continuous>    MEDICATIONS  (PRN):  HYDROmorphone  Injectable 0.5 milliGRAM(s) IV Push every 3 hours PRN Severe Pain (7 - 10)  sodium chloride 0.9% lock flush 10 milliLiter(s) IV Push every 1 hour PRN Pre/post blood products, medications, blood draw, and to maintain line patency      Drug Dosing Weight  Height (cm): 170.2 (05 May 2024 20:47)  Weight (kg): 52.4 (05 May 2024 20:52)  BMI (kg/m2): 18.1 (05 May 2024 20:52)  BSA (m2): 1.6 (05 May 2024 20:52)    PAST MEDICAL & SURGICAL HISTORY:    ICU Vital Signs Last 24 Hrs  T(C): 37 (08 May 2024 06:30), Max: 37.1 (07 May 2024 11:00)  T(F): 98.6 (08 May 2024 06:30), Max: 98.8 (07 May 2024 11:00)  HR: 92 (08 May 2024 10:30) (56 - 93)  BP: 116/91 (07 May 2024 12:00) (114/60 - 116/91)  BP(mean): 100 (07 May 2024 12:00) (76 - 100)  ABP: 150/59 (08 May 2024 10:30) (106/45 - 158/62)  ABP(mean): 95 (08 May 2024 10:30) (67 - 101)  RR: 26 (08 May 2024 10:30) (12 - 30)  SpO2: 95% (08 May 2024 10:30) (94% - 100%)    O2 Parameters below as of 08 May 2024 09:15  Patient On (Oxygen Delivery Method): ventilator, 40%          ABG - ( 08 May 2024 03:57 )  pH, Arterial: 7.450 pH, Blood: x     /  pCO2: 35    /  pO2: 92    / HCO3: 24    / Base Excess: 0.3   /  SaO2: 99.6              I&O's Detail    07 May 2024 07:01  -  08 May 2024 07:00  --------------------------------------------------------  IN:    Albumin 25%  -  50 mL: 400 mL    FentaNYL: 52.5 mL    FentaNYL: 42 mL    Heparin: 80 mL    Heparin Infusion: 40 mL    IV PiggyBack: 498 mL    IV PiggyBack: 200 mL    IV PiggyBack: 125 mL    Ketamine: 72 mL    Ketamine: 138.1 mL    Lactated Ringers: 1400 mL    Vasopressin: 27 mL  Total IN: 3074.6 mL    OUT:    Indwelling Catheter - Urethral (mL): 1220 mL    Nasogastric/Oral tube (mL): 50 mL    Norepinephrine: 0 mL    VAC (Vacuum Assisted Closure) System (mL): 300 mL    Vasopressin: 0 mL  Total OUT: 1570 mL    Total NET: 1504.6 mL      08 May 2024 07:01  -  08 May 2024 10:49  --------------------------------------------------------  IN:    Albumin 25%  -  50 mL: 100 mL    Heparin: 24 mL    Lactated Ringers: 300 mL  Total IN: 424 mL    OUT:    FentaNYL: 0 mL    Indwelling Catheter - Urethral (mL): 80 mL    Ketamine: 0 mL    Vasopressin: 0 mL  Total OUT: 80 mL    Total NET: 344 mL        Mode: standby,Pt extubated...placed on 40% CAM...      Physical Exam:    Neurological: Looks weak/frail, Eyes open but w/a blank look to them, able to follow commands and grunt answers to simple questions but when asked to verbalize, patient can only give minimal effort to have phonation    Neck: Neck supple, No JVD    Respiratory:  Equal chest rise.  Breath Sounds equal bilateral but slightly decreased at bases b/l    Cardiovascular: RRR    Gastrointestinal: Soft, appropriately tender, softly distended, no guarding/rebound, dressing clean and dry    Extremities: Warm, no edema    Skin: No rashes    PATIENT CARE ACCESS DEVICES:  [x ] Peripheral IV  [x ] Central Venous Line	[x ] R	[ ] L	[ ] IJ	[ ] Fem	[ ] SC	Placed:   [ ] Arterial Line		[ ] R	[ ] L	[ ] Fem	[ ] Rad	[ ] Ax	Placed:   [ ] PICC:					[ ] Mediport  [x ] Urinary Catheter:   [x ] Necessity of urinary, arterial, and venous catheters discussed    LABS:  CBC Full  -  ( 08 May 2024 04:00 )  WBC Count : 26.19 K/uL  RBC Count : 3.93 M/uL  Hemoglobin : 7.5 g/dL  Hematocrit : 22.6 %  Platelet Count - Automated : 238 K/uL  Mean Cell Volume : 57.5 fl  Mean Cell Hemoglobin : 19.1 pg  Mean Cell Hemoglobin Concentration : 33.2 gm/dL  Auto Neutrophil # : 23.84 K/uL  Auto Lymphocyte # : 1.15 K/uL  Auto Monocyte # : 0.68 K/uL  Auto Eosinophil # : 0.00 K/uL  Auto Basophil # : 0.04 K/uL  Auto Neutrophil % : 91.0 %  Auto Lymphocyte % : 4.4 %  Auto Monocyte % : 2.6 %  Auto Eosinophil % : 0.0 %  Auto Basophil % : 0.2 %    05-08    143  |  109<H>  |  10.9  ----------------------------<  90  3.8   |  21.0<L>  |  0.42<L>    Ca    7.6<L>      08 May 2024 04:00  Phos  3.0     05-08  Mg     2.1     05-08    TPro  4.2<L>  /  Alb  2.8<L>  /  TBili  0.5  /  DBili  0.2  /  AST  113<H>  /  ALT  21  /  AlkPhos  45  05-07    PT/INR - ( 08 May 2024 04:00 )   PT: 20.6 sec;   INR: 1.89 ratio         PTT - ( 08 May 2024 04:00 )  PTT:65.8 sec  Urinalysis Basic - ( 08 May 2024 04:00 )    Color: x / Appearance: x / SG: x / pH: x  Gluc: 90 mg/dL / Ketone: x  / Bili: x / Urobili: x   Blood: x / Protein: x / Nitrite: x   Leuk Esterase: x / RBC: x / WBC x   Sq Epi: x / Non Sq Epi: x / Bacteria: x

## 2024-05-08 NOTE — PROGRESS NOTE ADULT - SUBJECTIVE AND OBJECTIVE BOX
VASCULAR SURGERY PROGRESS NOTE    Subjective:   Patient examined at bedside this AM. Limited ROS 2/2 patient condition. NAEO     Objective:  Vital Signs  T(C): 37 (05-08 @ 06:30), Max: 37.9 (05-07 @ 08:30)  HR: 74 (05-08 @ 08:00) (56 - 85)  BP: 116/91 (05-07 @ 12:00) (95/55 - 116/91)  RR: 16 (05-08 @ 08:00) (12 - 23)  SpO2: 100% (05-08 @ 08:00) (94% - 100%)  05-07-24 @ 07:01  -  05-08-24 @ 07:00  --------------------------------------------------------  IN:  Total IN: 0 mL    OUT:    Indwelling Catheter - Urethral (mL): 1220 mL    Nasogastric/Oral tube (mL): 50 mL    VAC (Vacuum Assisted Closure) System (mL): 300 mL  Total OUT: 1570 mL    Total NET: -1570 mL      05-08-24 @ 07:01  -  05-08-24 @ 08:28  --------------------------------------------------------  IN:  Total IN: 0 mL    OUT:    Indwelling Catheter - Urethral (mL): 25 mL  Total OUT: 25 mL    Total NET: -25 mL      Physical Exam:  General: alert and oriented, NAD  Resp: airway patent, respirations unlabored  CVS: regular rate and rhythm on monitor  Abdomen: soft, nondistended, tender to palpation predominately in RLQ, prevena in place with good suction  Extremities: no edema, strong doppler signals in b/l DP  Skin: warm, dry, appropriate color      Labs:                        7.5    26.19 )-----------( 238      ( 08 May 2024 04:00 )             22.6   05-08    143  |  109<H>  |  10.9  ----------------------------<  90  3.8   |  21.0<L>  |  0.42<L>    Ca    7.6<L>      08 May 2024 04:00  Phos  3.0     05-08  Mg     2.1     05-08    TPro  4.2<L>  /  Alb  2.8<L>  /  TBili  0.5  /  DBili  0.2  /  AST  113<H>  /  ALT  21  /  AlkPhos  45  05-07

## 2024-05-09 LAB
ALBUMIN SERPL ELPH-MCNC: 3.7 G/DL — SIGNIFICANT CHANGE UP (ref 3.3–5.2)
ALP SERPL-CCNC: 58 U/L — SIGNIFICANT CHANGE UP (ref 40–120)
ALT FLD-CCNC: 32 U/L — SIGNIFICANT CHANGE UP
ANION GAP SERPL CALC-SCNC: 12 MMOL/L — SIGNIFICANT CHANGE UP (ref 5–17)
ANION GAP SERPL CALC-SCNC: 13 MMOL/L — SIGNIFICANT CHANGE UP (ref 5–17)
ANION GAP SERPL CALC-SCNC: 14 MMOL/L — SIGNIFICANT CHANGE UP (ref 5–17)
APTT BLD: 100.9 SEC — HIGH (ref 24.5–35.6)
APTT BLD: 46.5 SEC — HIGH (ref 24.5–35.6)
APTT BLD: 61.1 SEC — HIGH (ref 24.5–35.6)
APTT BLD: 71.3 SEC — HIGH (ref 24.5–35.6)
AST SERPL-CCNC: 91 U/L — HIGH
BASOPHILS # BLD AUTO: 0.04 K/UL — SIGNIFICANT CHANGE UP (ref 0–0.2)
BASOPHILS NFR BLD AUTO: 0.1 % — SIGNIFICANT CHANGE UP (ref 0–2)
BILIRUB DIRECT SERPL-MCNC: 0.3 MG/DL — SIGNIFICANT CHANGE UP (ref 0–0.3)
BILIRUB INDIRECT FLD-MCNC: 0.4 MG/DL — SIGNIFICANT CHANGE UP (ref 0.2–1)
BILIRUB SERPL-MCNC: 0.7 MG/DL — SIGNIFICANT CHANGE UP (ref 0.4–2)
BUN SERPL-MCNC: 10.3 MG/DL — SIGNIFICANT CHANGE UP (ref 8–20)
BUN SERPL-MCNC: 10.4 MG/DL — SIGNIFICANT CHANGE UP (ref 8–20)
BUN SERPL-MCNC: 11.1 MG/DL — SIGNIFICANT CHANGE UP (ref 8–20)
CALCIUM SERPL-MCNC: 7.4 MG/DL — LOW (ref 8.4–10.5)
CALCIUM SERPL-MCNC: 7.7 MG/DL — LOW (ref 8.4–10.5)
CALCIUM SERPL-MCNC: 7.8 MG/DL — LOW (ref 8.4–10.5)
CHLORIDE SERPL-SCNC: 105 MMOL/L — SIGNIFICANT CHANGE UP (ref 96–108)
CHLORIDE SERPL-SCNC: 106 MMOL/L — SIGNIFICANT CHANGE UP (ref 96–108)
CHLORIDE SERPL-SCNC: 106 MMOL/L — SIGNIFICANT CHANGE UP (ref 96–108)
CHLORIDE UR-SCNC: 97 MMOL/L — SIGNIFICANT CHANGE UP
CO2 SERPL-SCNC: 20 MMOL/L — LOW (ref 22–29)
CO2 SERPL-SCNC: 21 MMOL/L — LOW (ref 22–29)
CO2 SERPL-SCNC: 23 MMOL/L — SIGNIFICANT CHANGE UP (ref 22–29)
CREAT ?TM UR-MCNC: 134 MG/DL — SIGNIFICANT CHANGE UP
CREAT SERPL-MCNC: 0.41 MG/DL — LOW (ref 0.5–1.3)
CREAT SERPL-MCNC: 0.41 MG/DL — LOW (ref 0.5–1.3)
CREAT SERPL-MCNC: 0.43 MG/DL — LOW (ref 0.5–1.3)
EGFR: 111 ML/MIN/1.73M2 — SIGNIFICANT CHANGE UP
EGFR: 112 ML/MIN/1.73M2 — SIGNIFICANT CHANGE UP
EGFR: 112 ML/MIN/1.73M2 — SIGNIFICANT CHANGE UP
EOSINOPHIL # BLD AUTO: 0 K/UL — SIGNIFICANT CHANGE UP (ref 0–0.5)
EOSINOPHIL NFR BLD AUTO: 0 % — SIGNIFICANT CHANGE UP (ref 0–6)
GAS PNL BLDA: SIGNIFICANT CHANGE UP
GLUCOSE SERPL-MCNC: 117 MG/DL — HIGH (ref 70–99)
GLUCOSE SERPL-MCNC: 138 MG/DL — HIGH (ref 70–99)
GLUCOSE SERPL-MCNC: 92 MG/DL — SIGNIFICANT CHANGE UP (ref 70–99)
HCT VFR BLD CALC: 23.4 % — LOW (ref 34.5–45)
HCT VFR BLD CALC: 23.7 % — LOW (ref 34.5–45)
HGB BLD-MCNC: 7.8 G/DL — LOW (ref 11.5–15.5)
HGB BLD-MCNC: 7.9 G/DL — LOW (ref 11.5–15.5)
IMM GRANULOCYTES NFR BLD AUTO: 2.2 % — HIGH (ref 0–0.9)
LYMPHOCYTES # BLD AUTO: 1.84 K/UL — SIGNIFICANT CHANGE UP (ref 1–3.3)
LYMPHOCYTES # BLD AUTO: 6.4 % — LOW (ref 13–44)
MAGNESIUM SERPL-MCNC: 1.9 MG/DL — SIGNIFICANT CHANGE UP (ref 1.6–2.6)
MAGNESIUM SERPL-MCNC: 1.9 MG/DL — SIGNIFICANT CHANGE UP (ref 1.6–2.6)
MAGNESIUM SERPL-MCNC: 2.4 MG/DL — SIGNIFICANT CHANGE UP (ref 1.8–2.6)
MCHC RBC-ENTMCNC: 19 PG — LOW (ref 27–34)
MCHC RBC-ENTMCNC: 19.3 PG — LOW (ref 27–34)
MCHC RBC-ENTMCNC: 32.9 GM/DL — SIGNIFICANT CHANGE UP (ref 32–36)
MCHC RBC-ENTMCNC: 33.8 GM/DL — SIGNIFICANT CHANGE UP (ref 32–36)
MCV RBC AUTO: 57.2 FL — LOW (ref 80–100)
MCV RBC AUTO: 57.7 FL — LOW (ref 80–100)
MONOCYTES # BLD AUTO: 1.13 K/UL — HIGH (ref 0–0.9)
MONOCYTES NFR BLD AUTO: 3.9 % — SIGNIFICANT CHANGE UP (ref 2–14)
NEUTROPHILS # BLD AUTO: 25.29 K/UL — HIGH (ref 1.8–7.4)
NEUTROPHILS NFR BLD AUTO: 87.4 % — HIGH (ref 43–77)
PHOSPHATE SERPL-MCNC: 1.5 MG/DL — LOW (ref 2.4–4.7)
PHOSPHATE SERPL-MCNC: 2.3 MG/DL — LOW (ref 2.4–4.7)
PHOSPHATE SERPL-MCNC: 3.4 MG/DL — SIGNIFICANT CHANGE UP (ref 2.4–4.7)
PLATELET # BLD AUTO: 254 K/UL — SIGNIFICANT CHANGE UP (ref 150–400)
PLATELET # BLD AUTO: 255 K/UL — SIGNIFICANT CHANGE UP (ref 150–400)
POTASSIUM SERPL-MCNC: 3.4 MMOL/L — LOW (ref 3.5–5.3)
POTASSIUM SERPL-MCNC: 3.6 MMOL/L — SIGNIFICANT CHANGE UP (ref 3.5–5.3)
POTASSIUM SERPL-MCNC: 4 MMOL/L — SIGNIFICANT CHANGE UP (ref 3.5–5.3)
POTASSIUM SERPL-SCNC: 3.4 MMOL/L — LOW (ref 3.5–5.3)
POTASSIUM SERPL-SCNC: 3.6 MMOL/L — SIGNIFICANT CHANGE UP (ref 3.5–5.3)
POTASSIUM SERPL-SCNC: 4 MMOL/L — SIGNIFICANT CHANGE UP (ref 3.5–5.3)
PROT SERPL-MCNC: 5.1 G/DL — LOW (ref 6.6–8.7)
RBC # BLD: 4.09 M/UL — SIGNIFICANT CHANGE UP (ref 3.8–5.2)
RBC # BLD: 4.11 M/UL — SIGNIFICANT CHANGE UP (ref 3.8–5.2)
RBC # FLD: 20.1 % — HIGH (ref 10.3–14.5)
RBC # FLD: 20.2 % — HIGH (ref 10.3–14.5)
SODIUM SERPL-SCNC: 139 MMOL/L — SIGNIFICANT CHANGE UP (ref 135–145)
SODIUM SERPL-SCNC: 140 MMOL/L — SIGNIFICANT CHANGE UP (ref 135–145)
SODIUM SERPL-SCNC: 141 MMOL/L — SIGNIFICANT CHANGE UP (ref 135–145)
SODIUM UR-SCNC: 65 MMOL/L — SIGNIFICANT CHANGE UP
WBC # BLD: 28.68 K/UL — HIGH (ref 3.8–10.5)
WBC # BLD: 28.93 K/UL — HIGH (ref 3.8–10.5)
WBC # FLD AUTO: 28.68 K/UL — HIGH (ref 3.8–10.5)
WBC # FLD AUTO: 28.93 K/UL — HIGH (ref 3.8–10.5)

## 2024-05-09 PROCEDURE — 71045 X-RAY EXAM CHEST 1 VIEW: CPT | Mod: 26

## 2024-05-09 PROCEDURE — 93010 ELECTROCARDIOGRAM REPORT: CPT

## 2024-05-09 RX ORDER — HEPARIN SODIUM 5000 [USP'U]/ML
INJECTION INTRAVENOUS; SUBCUTANEOUS
Qty: 25000 | Refills: 0 | Status: DISCONTINUED | OUTPATIENT
Start: 2024-05-09 | End: 2024-05-09

## 2024-05-09 RX ORDER — CALCIUM GLUCONATE 100 MG/ML
2 VIAL (ML) INTRAVENOUS ONCE
Refills: 0 | Status: COMPLETED | OUTPATIENT
Start: 2024-05-09 | End: 2024-05-09

## 2024-05-09 RX ORDER — GABAPENTIN 400 MG/1
300 CAPSULE ORAL THREE TIMES A DAY
Refills: 0 | Status: DISCONTINUED | OUTPATIENT
Start: 2024-05-09 | End: 2024-05-10

## 2024-05-09 RX ORDER — POTASSIUM CHLORIDE 20 MEQ
10 PACKET (EA) ORAL
Refills: 0 | Status: COMPLETED | OUTPATIENT
Start: 2024-05-09 | End: 2024-05-09

## 2024-05-09 RX ORDER — POTASSIUM CHLORIDE 20 MEQ
20 PACKET (EA) ORAL
Refills: 0 | Status: COMPLETED | OUTPATIENT
Start: 2024-05-09 | End: 2024-05-09

## 2024-05-09 RX ORDER — BUPRENORPHINE AND NALOXONE 2; .5 MG/1; MG/1
4 TABLET SUBLINGUAL THREE TIMES A DAY
Refills: 0 | Status: DISCONTINUED | OUTPATIENT
Start: 2024-05-09 | End: 2024-05-09

## 2024-05-09 RX ORDER — DEXMEDETOMIDINE HYDROCHLORIDE IN 0.9% SODIUM CHLORIDE 4 UG/ML
0.5 INJECTION INTRAVENOUS
Qty: 200 | Refills: 0 | Status: DISCONTINUED | OUTPATIENT
Start: 2024-05-09 | End: 2024-05-09

## 2024-05-09 RX ORDER — ACETAMINOPHEN 500 MG
1000 TABLET ORAL EVERY 6 HOURS
Refills: 0 | Status: DISCONTINUED | OUTPATIENT
Start: 2024-05-09 | End: 2024-05-10

## 2024-05-09 RX ORDER — POTASSIUM CHLORIDE 20 MEQ
20 PACKET (EA) ORAL ONCE
Refills: 0 | Status: DISCONTINUED | OUTPATIENT
Start: 2024-05-09 | End: 2024-05-09

## 2024-05-09 RX ORDER — BUPRENORPHINE AND NALOXONE 2; .5 MG/1; MG/1
1 TABLET SUBLINGUAL THREE TIMES A DAY
Refills: 0 | Status: DISCONTINUED | OUTPATIENT
Start: 2024-05-09 | End: 2024-05-10

## 2024-05-09 RX ORDER — HYDROMORPHONE HYDROCHLORIDE 2 MG/ML
1 INJECTION INTRAMUSCULAR; INTRAVENOUS; SUBCUTANEOUS
Refills: 0 | Status: DISCONTINUED | OUTPATIENT
Start: 2024-05-09 | End: 2024-05-09

## 2024-05-09 RX ORDER — FUROSEMIDE 40 MG
40 TABLET ORAL DAILY
Refills: 0 | Status: DISCONTINUED | OUTPATIENT
Start: 2024-05-09 | End: 2024-05-10

## 2024-05-09 RX ORDER — MAGNESIUM SULFATE 500 MG/ML
2 VIAL (ML) INJECTION ONCE
Refills: 0 | Status: COMPLETED | OUTPATIENT
Start: 2024-05-09 | End: 2024-05-09

## 2024-05-09 RX ORDER — QUETIAPINE FUMARATE 200 MG/1
100 TABLET, FILM COATED ORAL AT BEDTIME
Refills: 0 | Status: DISCONTINUED | OUTPATIENT
Start: 2024-05-09 | End: 2024-05-09

## 2024-05-09 RX ORDER — GABAPENTIN 400 MG/1
300 CAPSULE ORAL EVERY 8 HOURS
Refills: 0 | Status: DISCONTINUED | OUTPATIENT
Start: 2024-05-09 | End: 2024-05-09

## 2024-05-09 RX ORDER — HYDROMORPHONE HYDROCHLORIDE 2 MG/ML
0.5 INJECTION INTRAMUSCULAR; INTRAVENOUS; SUBCUTANEOUS
Refills: 0 | Status: DISCONTINUED | OUTPATIENT
Start: 2024-05-09 | End: 2024-05-09

## 2024-05-09 RX ORDER — METHOCARBAMOL 500 MG/1
750 TABLET, FILM COATED ORAL EVERY 8 HOURS
Refills: 0 | Status: DISCONTINUED | OUTPATIENT
Start: 2024-05-09 | End: 2024-05-10

## 2024-05-09 RX ORDER — DEXMEDETOMIDINE HYDROCHLORIDE IN 0.9% SODIUM CHLORIDE 4 UG/ML
0.1 INJECTION INTRAVENOUS
Qty: 200 | Refills: 0 | Status: DISCONTINUED | OUTPATIENT
Start: 2024-05-09 | End: 2024-05-09

## 2024-05-09 RX ORDER — CALCIUM GLUCONATE 100 MG/ML
1 VIAL (ML) INTRAVENOUS ONCE
Refills: 0 | Status: COMPLETED | OUTPATIENT
Start: 2024-05-09 | End: 2024-05-09

## 2024-05-09 RX ORDER — BUPRENORPHINE AND NALOXONE 2; .5 MG/1; MG/1
1 TABLET SUBLINGUAL THREE TIMES A DAY
Refills: 0 | Status: DISCONTINUED | OUTPATIENT
Start: 2024-05-09 | End: 2024-05-09

## 2024-05-09 RX ORDER — FUROSEMIDE 40 MG
20 TABLET ORAL ONCE
Refills: 0 | Status: COMPLETED | OUTPATIENT
Start: 2024-05-09 | End: 2024-05-09

## 2024-05-09 RX ORDER — HEPARIN SODIUM 5000 [USP'U]/ML
800 INJECTION INTRAVENOUS; SUBCUTANEOUS
Qty: 25000 | Refills: 0 | Status: DISCONTINUED | OUTPATIENT
Start: 2024-05-09 | End: 2024-05-10

## 2024-05-09 RX ORDER — POTASSIUM CHLORIDE 20 MEQ
20 PACKET (EA) ORAL
Refills: 0 | Status: DISCONTINUED | OUTPATIENT
Start: 2024-05-09 | End: 2024-05-09

## 2024-05-09 RX ADMIN — HYDROMORPHONE HYDROCHLORIDE 1 MILLIGRAM(S): 2 INJECTION INTRAMUSCULAR; INTRAVENOUS; SUBCUTANEOUS at 10:30

## 2024-05-09 RX ADMIN — HEPARIN SODIUM 1000 UNIT(S)/HR: 5000 INJECTION INTRAVENOUS; SUBCUTANEOUS at 05:21

## 2024-05-09 RX ADMIN — Medication 200 GRAM(S): at 18:26

## 2024-05-09 RX ADMIN — Medication 3 MILLILITER(S): at 21:08

## 2024-05-09 RX ADMIN — Medication 20 MILLIGRAM(S): at 10:31

## 2024-05-09 RX ADMIN — METHOCARBAMOL 750 MILLIGRAM(S): 500 TABLET, FILM COATED ORAL at 22:20

## 2024-05-09 RX ADMIN — HYDROMORPHONE HYDROCHLORIDE 0.5 MILLIGRAM(S): 2 INJECTION INTRAMUSCULAR; INTRAVENOUS; SUBCUTANEOUS at 07:30

## 2024-05-09 RX ADMIN — Medication 3 MILLILITER(S): at 14:56

## 2024-05-09 RX ADMIN — Medication 3 MILLILITER(S): at 08:25

## 2024-05-09 RX ADMIN — Medication 25 GRAM(S): at 13:01

## 2024-05-09 RX ADMIN — Medication 85 MILLIMOLE(S): at 05:09

## 2024-05-09 RX ADMIN — PIPERACILLIN AND TAZOBACTAM 25 GRAM(S): 4; .5 INJECTION, POWDER, LYOPHILIZED, FOR SOLUTION INTRAVENOUS at 22:20

## 2024-05-09 RX ADMIN — Medication 100 MILLIEQUIVALENT(S): at 05:10

## 2024-05-09 RX ADMIN — PIPERACILLIN AND TAZOBACTAM 25 GRAM(S): 4; .5 INJECTION, POWDER, LYOPHILIZED, FOR SOLUTION INTRAVENOUS at 05:10

## 2024-05-09 RX ADMIN — GABAPENTIN 300 MILLIGRAM(S): 400 CAPSULE ORAL at 13:06

## 2024-05-09 RX ADMIN — HYDROMORPHONE HYDROCHLORIDE 1 MILLIGRAM(S): 2 INJECTION INTRAMUSCULAR; INTRAVENOUS; SUBCUTANEOUS at 11:30

## 2024-05-09 RX ADMIN — HEPARIN SODIUM 1000 UNIT(S)/HR: 5000 INJECTION INTRAVENOUS; SUBCUTANEOUS at 07:15

## 2024-05-09 RX ADMIN — QUETIAPINE FUMARATE 100 MILLIGRAM(S): 200 TABLET, FILM COATED ORAL at 12:56

## 2024-05-09 RX ADMIN — CHLORHEXIDINE GLUCONATE 1 APPLICATION(S): 213 SOLUTION TOPICAL at 11:33

## 2024-05-09 RX ADMIN — HEPARIN SODIUM 1000 UNIT(S)/HR: 5000 INJECTION INTRAVENOUS; SUBCUTANEOUS at 13:13

## 2024-05-09 RX ADMIN — Medication 100 MILLIEQUIVALENT(S): at 06:18

## 2024-05-09 RX ADMIN — Medication 100 GRAM(S): at 11:27

## 2024-05-09 RX ADMIN — BUDESONIDE AND FORMOTEROL FUMARATE DIHYDRATE 2 PUFF(S): 160; 4.5 AEROSOL RESPIRATORY (INHALATION) at 08:25

## 2024-05-09 RX ADMIN — HYDROMORPHONE HYDROCHLORIDE 0.5 MILLIGRAM(S): 2 INJECTION INTRAMUSCULAR; INTRAVENOUS; SUBCUTANEOUS at 08:30

## 2024-05-09 RX ADMIN — Medication 20 MILLIEQUIVALENT(S): at 18:26

## 2024-05-09 RX ADMIN — GABAPENTIN 300 MILLIGRAM(S): 400 CAPSULE ORAL at 22:20

## 2024-05-09 RX ADMIN — Medication 100 MILLIEQUIVALENT(S): at 04:12

## 2024-05-09 RX ADMIN — SODIUM CHLORIDE 100 MILLILITER(S): 9 INJECTION, SOLUTION INTRAVENOUS at 07:41

## 2024-05-09 RX ADMIN — Medication 20 MILLIEQUIVALENT(S): at 22:20

## 2024-05-09 RX ADMIN — BUPRENORPHINE AND NALOXONE 1 TABLET(S): 2; .5 TABLET SUBLINGUAL at 13:00

## 2024-05-09 RX ADMIN — PANTOPRAZOLE SODIUM 40 MILLIGRAM(S): 20 TABLET, DELAYED RELEASE ORAL at 11:27

## 2024-05-09 RX ADMIN — BUDESONIDE AND FORMOTEROL FUMARATE DIHYDRATE 2 PUFF(S): 160; 4.5 AEROSOL RESPIRATORY (INHALATION) at 21:09

## 2024-05-09 RX ADMIN — Medication 3 MILLILITER(S): at 04:57

## 2024-05-09 RX ADMIN — PIPERACILLIN AND TAZOBACTAM 25 GRAM(S): 4; .5 INJECTION, POWDER, LYOPHILIZED, FOR SOLUTION INTRAVENOUS at 13:07

## 2024-05-09 RX ADMIN — DEXMEDETOMIDINE HYDROCHLORIDE IN 0.9% SODIUM CHLORIDE 6.55 MICROGRAM(S)/KG/HR: 4 INJECTION INTRAVENOUS at 02:24

## 2024-05-09 RX ADMIN — Medication 63.75 MILLIMOLE(S): at 12:56

## 2024-05-09 RX ADMIN — Medication 300 MILLIGRAM(S): at 11:27

## 2024-05-09 RX ADMIN — METHOCARBAMOL 750 MILLIGRAM(S): 500 TABLET, FILM COATED ORAL at 13:00

## 2024-05-09 RX ADMIN — Medication 50 MILLIGRAM(S): at 05:09

## 2024-05-09 RX ADMIN — HEPARIN SODIUM 800 UNIT(S)/HR: 5000 INJECTION INTRAVENOUS; SUBCUTANEOUS at 22:28

## 2024-05-09 RX ADMIN — Medication 20 MILLIEQUIVALENT(S): at 17:47

## 2024-05-09 NOTE — PROGRESS NOTE ADULT - SUBJECTIVE AND OBJECTIVE BOX
SICU Attending Progress Note    24H Events:    Patient seen and examined with the SICU team on AM rounds.  Extubated yesterday, now on NC.  Endorses one BM.  HDS at this time.          acetaminophen   IVPB .. 1000 milliGRAM(s) IV Intermittent every 6 hours PRN  albuterol/ipratropium for Nebulization 3 milliLiter(s) Nebulizer every 6 hours  aspirin Suppository 300 milliGRAM(s) Rectal daily  budesonide 160 MICROgram(s)/formoterol 4.5 MICROgram(s) Inhaler 2 Puff(s) Inhalation two times a day  buprenorphine 8 mG/naloxone 2 mG SL  Tablet 1 Tablet(s) SubLingual three times a day  chlorhexidine 2% Cloths 1 Application(s) Topical daily  gabapentin Solution 300 milliGRAM(s) Oral three times a day  heparin  Infusion.  Unit(s)/Hr IV Continuous <Continuous>  lactated ringers. 1000 milliLiter(s) IV Continuous <Continuous>  methocarbamol 750 milliGRAM(s) Oral every 8 hours  pantoprazole  Injectable 40 milliGRAM(s) IV Push daily  piperacillin/tazobactam IVPB.. 3.375 Gram(s) IV Intermittent every 8 hours  QUEtiapine 100 milliGRAM(s) Oral at bedtime  sodium chloride 0.9% lock flush 10 milliLiter(s) IV Push every 1 hour PRN    T(C): 36.7 (05-09-24 @ 12:03), Max: 36.7 (05-09-24 @ 08:00)  HR: 108 (05-09-24 @ 12:00) (76 - 118)  BP: --  RR: 27 (05-09-24 @ 12:00) (14 - 40)  SpO2: 91% (05-09-24 @ 12:00) (89% - 100%)      05-08-24 @ 07:01  -  05-09-24 @ 07:00  --------------------------------------------------------  IN: 3289.3 mL / OUT: 1150 mL / NET: 2139.3 mL    05-09-24 @ 07:01  -  05-09-24 @ 14:13  --------------------------------------------------------  IN: 823 mL / OUT: 200 mL / NET: 623 mL      PHYSICAL EXAM:  GENERAL:  Resting in bed  HEENT:  Head atraumatic, EOMI, PERRLA, conjunctiva and sclera clear; Moist mucous membranes, normal oropharynx  NECK: Supple, No JVD, no lymphadenopathy, no thyroid nodules or enlargement  CHEST/LUNG: Clear to auscultation bilaterally; No rales, rhonchi, wheezing, or rubs. Some tachypnea when agitated  HEART: Regular rate and rhythm; No murmurs, rubs, or gallops  ABDOMEN: Abdomen TTP appropriately, non-distended, serosanguinous output.  EXTREMITIES:  2+ Peripheral Pulses, brisk capillary refill. No clubbing, cyanosis, or edema  NERVOUS SYSTEM:  Alert & Oriented X3, non-focal and spontaneous movements of all extremities  SKIN: No rashes or lesions        Assesment:63yFemale with severe diffuse PAD presents with abdominal pain, leukocytosis, and pneumoperitoneum, now s/p exploratory laparotomy with SBR.  Patient in septic shock, intubated, and on vasopressors, now all resolved.  S/p abdominal re-exploration and anastomosis.  Recovering well.      Plan:    Neuro: Pain controlled with dilaudid. Will obtain pain management consultation. Patient has extensive prior narcotic usage, and is on unprescribed Suboxone at home.    Pulm: On 4L NC saturating 95%.  Some hypoxia on ABG, will attempt diuresis to improve respiration. CXR shows some basilar congestion. Will obtain ABG.  Card: HDS, no tachycardia.  EKG shows no signs of ischemia at this time.  GI: NPO, NGT to LIS.  BM overnight, AROBF.  Endo: BG monitoring, ISS coverage. SDS on board Hydrocortisone to end today.  Renal: Voiding appropriately.  ID: piperacillin/tazobactam IVPB.. 3.375 Gram(s) IV Intermittent every 8 hours  Trend WBC, cultures pending.  Heme: Trend Hgb, transfuse PRN.  Tubes/Lines: Arterial line.      45 minutes of critical care.

## 2024-05-09 NOTE — CONSULT NOTE ADULT - ASSESSMENT
ASSESSMENT: 62yo F presenting with acute mesenteric ischemia.    PLAN:  - to go to OR emergently with vascular and general surgery   - IV Abx  - hep gtt  - SICU aware  - rest of plan pending operative findings    Pt seen and plan discussed with attending, Dr. Barahona  
64yo F w/ hx of PAD, carotid stenosis, distal aorta occlusion, renal artery stenosis s/p L renal stent, MI, tobacco use, DVT on Eliquis presenting with abdominal pain and imaging findings consistent with mesenteric ischemia.   Small bowel resection 06-May-2024  pt takes unprescribed suboxone 2mg-4mg/daily as per  for pain control with hx of heroin use    Med Recs:  start suboxone 4mg q8h HOLD for sedation last dose of iv dilaudid was 1030 - suboxone to be started 1500  - Recommend starting gabapentin NGT 300mg [id number] standing. HOLD for sedation  - to be used for anxiolysis  methocarbamol 750 milliGRAM(s) Oral every 8 hours

## 2024-05-09 NOTE — CONSULT NOTE ADULT - SUBJECTIVE AND OBJECTIVE BOX
Patient is a 63y old  Female who presents with a chief complaint of mesenteric ischemia (08 May 2024 10:49)      HPI:  62yo F w/ hx of PAD, carotid stenosis, distal aorta occlusion, renal artery stenosis s/p L renal stent, MI, tobacco use, DVT on Eliquis presenting with abdominal pain and imaging findings consistent with mesenteric ischemia. Pt states that she began feeling stabbing pain in her abdomen last night prompting her to come to ED. She has never had this pain before. On arrival to [place] she was HDS, labs notable for elevated lactate and WBC 40k. CT A/P demonstrating moderate volume pneumoperitoneum and severe stenosis of SMA and complete occlusion of ostium of celiac and inferior mesenteric arteries. Likely ischemic bowel. Transferred to [facility] for further management.  (05 May 2024 20:59)            Analgesic Dosing for past 24 hours reviewed as below:    aspirin Suppository   300 milliGRAM(s) Rectal (05-09-24 @ 11:27)   300 milliGRAM(s) Rectal (05-08-24 @ 14:13)    haloperidol    Injectable   5 milliGRAM(s) IV Push (05-08-24 @ 19:35)    HYDROmorphone  Injectable   1 milliGRAM(s) IV Push (05-09-24 @ 10:30)    HYDROmorphone  Injectable   0.5 milliGRAM(s) IV Push (05-09-24 @ 07:30)   0.5 milliGRAM(s) IV Push (05-08-24 @ 23:38)   0.5 milliGRAM(s) IV Push (05-08-24 @ 20:28)          T(C): 36.7 (05-09-24 @ 08:00), Max: 36.7 (05-09-24 @ 08:00)  HR: 89 (05-09-24 @ 11:00) (76 - 118)  BP: --  RR: 31 (05-09-24 @ 11:00) (14 - 40)  SpO2: 96% (05-09-24 @ 11:00) (89% - 100%)      05-08-24 @ 07:01  -  05-09-24 @ 07:00  --------------------------------------------------------  IN: 3289.3 mL / OUT: 1150 mL / NET: 2139.3 mL    05-09-24 @ 07:01  -  05-09-24 @ 11:45  --------------------------------------------------------  IN: 503 mL / OUT: 0 mL / NET: 503 mL        acetaminophen   IVPB .. 1000 milliGRAM(s) IV Intermittent every 6 hours PRN  albuterol/ipratropium for Nebulization 3 milliLiter(s) Nebulizer every 6 hours  aspirin Suppository 300 milliGRAM(s) Rectal daily  budesonide 160 MICROgram(s)/formoterol 4.5 MICROgram(s) Inhaler 2 Puff(s) Inhalation two times a day  buprenorphine 2 mG/naloxone 0.5 mG SL  Tablet 4 Tablet(s) SubLingual three times a day  chlorhexidine 2% Cloths 1 Application(s) Topical daily  gabapentin 300 milliGRAM(s) Oral every 8 hours  heparin  Infusion.  Unit(s)/Hr IV Continuous <Continuous>  lactated ringers. 1000 milliLiter(s) IV Continuous <Continuous>  methocarbamol 750 milliGRAM(s) Oral every 8 hours  pantoprazole  Injectable 40 milliGRAM(s) IV Push daily  piperacillin/tazobactam IVPB.. 3.375 Gram(s) IV Intermittent every 8 hours  sodium chloride 0.9% lock flush 10 milliLiter(s) IV Push every 1 hour PRN                          7.9    28.68 )-----------( 254      ( 09 May 2024 10:30 )             23.4     05-09    140  |  106  |  11.1  ----------------------------<  117<H>  4.0   |  20.0<L>  |  0.43<L>    Ca    7.7<L>      09 May 2024 10:30  Phos  2.3     05-09  Mg     1.9     05-09    TPro  4.2<L>  /  Alb  2.8<L>  /  TBili  0.5  /  DBili  0.2  /  AST  113<H>  /  ALT  21  /  AlkPhos  45  05-07    PT/INR - ( 08 May 2024 04:00 )   PT: 20.6 sec;   INR: 1.89 ratio         PTT - ( 09 May 2024 03:08 )  PTT:46.5 sec  Urinalysis Basic - ( 09 May 2024 10:30 )    Color: x / Appearance: x / SG: x / pH: x  Gluc: 117 mg/dL / Ketone: x  / Bili: x / Urobili: x   Blood: x / Protein: x / Nitrite: x   Leuk Esterase: x / RBC: x / WBC x   Sq Epi: x / Non Sq Epi: x / Bacteria: x        Pain Service   [phone]
SUBJECTIVE: 62yo F w/ hx of PAD, carotid stenosis, distal aorta occlusion, renal artery stenosis s/p L renal stent, MI, tobacco use, DVT on Eliquis presenting with abdominal pain and imaging findings consistent with mesenteric ischemia. Pt states that she began feeling stabbing pain in her abdomen last night prompting her to come to ED. She has never had this pain before. On arrival to [place] she was HDS, labs notable for elevated lactate and WBC 40k. CT A/P demonstrating moderate volume pneumoperitoneum and severe stenosis of SMA and complete occlusion of ostium of celiac and inferior mesenteric arteries. Likely ischemic bowel. Transferred to [facility] for further management.     Vitals:  T(C): 37.7 (05-05-24 @ 22:04), Max: 37.7 (05-05-24 @ 22:04)  T(F): 99.8 (05-05-24 @ 22:04), Max: 99.8 (05-05-24 @ 22:04)  HR: 79 (05-05-24 @ 21:59) (76 - 82)  BP: 112/94 (05-05-24 @ 21:59) (112/94 - 147/52)  ABP: --  ABP(mean): --  RR: 22 (05-05-24 @ 21:59) (13 - 22)  SpO2: 91% (05-05-24 @ 21:59) (91% - 98%)      GENERAL: NAD  HEAD:  Atraumatic, normocephalic  NECK: Supple, no JVD  HEART: RRR  LUNGS: Unlabored respirations. No conversational dyspnea.   ABDOMEN: Soft, extremely tender to palpation, especially in epigastrium  EXTREMITIES: No clubbing, cyanosis, or edema  NERVOUS SYSTEM:  A&Ox3, no focal deficits   SKIN: No rashes or lesion    Labs, Radiology, Cardiology, and Other Results: CT ABD PEL W IV CONTRAST [id number]      PROCEDURE DATE: May 5 2024      CLINICAL INFORMATION: Abdominal pain and urinary retention.    COMPARISON: CT abdomen pelvis from 3/25/2024.    CONTRAST/COMPLICATIONS:  IV Contrast: Omnipaque 350 90 cc administered 10 cc discarded  Oral Contrast: NONE  Complications: None reported at time of study completion    PROCEDURE:  CT of the Abdomen and Pelvis was performed.  Sagittal and coronal reformats were performed.    FINDINGS:  LOWER CHEST: Within normal limits.    LIVER: Within normal limits.  BILE DUCTS: Normal caliber.  GALLBLADDER: Within normal limits.  SPLEEN: Within normal limits.  PANCREAS: Mild dilatation of the main pancreatic duct measuring up to 5 mm,  new from 3/25/2024.  ADRENALS: Stable thickening of the bilateral adrenal glands.  KIDNEYS/URETERS: Atrophic right kidney with multiple scattered cysts. No  hydronephrosis bilaterally. 2.7 cm left kidney lower pole cyst.    BLADDER: Within normal limits.  REPRODUCTIVE ORGANS: Uterus and adnexa within normal limits.    BOWEL: Multiple dilated loops of proximal small bowel to 2.5 cm with  air-fluid levels and a transition point in the mid pelvis (3:103). There is  circumferential wall thickening of the dilated loops of small bowel with  adjacent fatty stranding. Multiple locules of free air, most prominent in  the right hemiabdomen (3:58-91).  PERITONEUM: Moderate volume pneumoperitoneum.  VESSELS: Eccentric thrombus within the thoracic and proximal abdominal aorta  with complete total occlusion of the infrarenal aorta and bilateral common  iliac arteries. Reconstitution of diminutive bilateral external iliac  arteries via collaterals. Patent left renal artery stent. Severe stenosis of  the origin of the superior mesenteric artery. Complete occlusion of the  ostium of the celiac and inferior mesenteric arteries.  RETROPERITONEUM/LYMPH NODES: No lymphadenopathy.  ABDOMINAL WALL: Within normal limits.  BONES: Degenerative changes.    IMPRESSION:  Small bowel dilatation with a transition point in the mid pelvis, moderate  volume pneumoperitoneum, and eccentric abdominal aortic thrombus/infrarenal  aortic occlusion. Severe stenosis of the origin of the superior mesenteric  artery and complete occlusion of the ostium of the celiac and inferior  mesenteric arteries. These findings are concerning for mesenteric ischemia  with bowel perforation likely in the right mid abdomen.    Mild dilatation of the main pancreatic duct measured 5 mm, new from  3/25/2024.    These emergent findings were discussed with, and acknowledged by, Dr. Belcher  at 5/5/2024 5:45 PM by Dr. Carreon.

## 2024-05-09 NOTE — PROGRESS NOTE ADULT - REASON FOR ADMISSION
mesenteric ischemia

## 2024-05-10 LAB
ANION GAP SERPL CALC-SCNC: 12 MMOL/L — SIGNIFICANT CHANGE UP (ref 5–17)
BASE EXCESS BLDA CALC-SCNC: 0.3 MMOL/L — SIGNIFICANT CHANGE UP (ref -2–3)
BLOOD GAS COMMENTS ARTERIAL: SIGNIFICANT CHANGE UP
BUN SERPL-MCNC: 10.7 MG/DL — SIGNIFICANT CHANGE UP (ref 8–20)
CALCIUM SERPL-MCNC: 7.8 MG/DL — LOW (ref 8.4–10.5)
CHLORIDE SERPL-SCNC: 104 MMOL/L — SIGNIFICANT CHANGE UP (ref 96–108)
CO2 SERPL-SCNC: 23 MMOL/L — SIGNIFICANT CHANGE UP (ref 22–29)
CREAT SERPL-MCNC: 0.42 MG/DL — LOW (ref 0.5–1.3)
EGFR: 111 ML/MIN/1.73M2 — SIGNIFICANT CHANGE UP
GAS PNL BLDA: SIGNIFICANT CHANGE UP
GLUCOSE SERPL-MCNC: 99 MG/DL — SIGNIFICANT CHANGE UP (ref 70–99)
HCO3 BLDA-SCNC: 25 MMOL/L — SIGNIFICANT CHANGE UP (ref 21–28)
HCT VFR BLD CALC: 23.9 % — LOW (ref 34.5–45)
HGB BLD-MCNC: 7.9 G/DL — LOW (ref 11.5–15.5)
HOROWITZ INDEX BLDA+IHG-RTO: 100 — SIGNIFICANT CHANGE UP
MAGNESIUM SERPL-MCNC: 2.1 MG/DL — SIGNIFICANT CHANGE UP (ref 1.6–2.6)
MCHC RBC-ENTMCNC: 19 PG — LOW (ref 27–34)
MCHC RBC-ENTMCNC: 33.1 GM/DL — SIGNIFICANT CHANGE UP (ref 32–36)
MCV RBC AUTO: 57.5 FL — LOW (ref 80–100)
PCO2 BLDA: 37 MMHG — SIGNIFICANT CHANGE UP (ref 32–45)
PH BLDA: 7.43 — SIGNIFICANT CHANGE UP (ref 7.35–7.45)
PHOSPHATE SERPL-MCNC: 2.5 MG/DL — SIGNIFICANT CHANGE UP (ref 2.4–4.7)
PLATELET # BLD AUTO: 245 K/UL — SIGNIFICANT CHANGE UP (ref 150–400)
PO2 BLDA: 59 MMHG — LOW (ref 83–108)
POTASSIUM SERPL-MCNC: 4.4 MMOL/L — SIGNIFICANT CHANGE UP (ref 3.5–5.3)
POTASSIUM SERPL-SCNC: 4.4 MMOL/L — SIGNIFICANT CHANGE UP (ref 3.5–5.3)
RBC # BLD: 4.16 M/UL — SIGNIFICANT CHANGE UP (ref 3.8–5.2)
RBC # FLD: 20.4 % — HIGH (ref 10.3–14.5)
SAO2 % BLDA: 90 % — LOW (ref 94–98)
SODIUM SERPL-SCNC: 139 MMOL/L — SIGNIFICANT CHANGE UP (ref 135–145)
TROPONIN T, HIGH SENSITIVITY RESULT: 54 NG/L — HIGH (ref 0–51)
WBC # BLD: 27.18 K/UL — HIGH (ref 3.8–10.5)
WBC # FLD AUTO: 27.18 K/UL — HIGH (ref 3.8–10.5)

## 2024-05-10 PROCEDURE — P9047: CPT

## 2024-05-10 PROCEDURE — 83735 ASSAY OF MAGNESIUM: CPT

## 2024-05-10 PROCEDURE — 71045 X-RAY EXAM CHEST 1 VIEW: CPT

## 2024-05-10 PROCEDURE — 71045 X-RAY EXAM CHEST 1 VIEW: CPT | Mod: 26

## 2024-05-10 PROCEDURE — 93005 ELECTROCARDIOGRAM TRACING: CPT

## 2024-05-10 PROCEDURE — 84300 ASSAY OF URINE SODIUM: CPT

## 2024-05-10 PROCEDURE — C1887: CPT

## 2024-05-10 PROCEDURE — C1894: CPT

## 2024-05-10 PROCEDURE — 82947 ASSAY GLUCOSE BLOOD QUANT: CPT

## 2024-05-10 PROCEDURE — 82435 ASSAY OF BLOOD CHLORIDE: CPT

## 2024-05-10 PROCEDURE — 85730 THROMBOPLASTIN TIME PARTIAL: CPT

## 2024-05-10 PROCEDURE — 85018 HEMOGLOBIN: CPT

## 2024-05-10 PROCEDURE — 82330 ASSAY OF CALCIUM: CPT

## 2024-05-10 PROCEDURE — C8929: CPT

## 2024-05-10 PROCEDURE — 86923 COMPATIBILITY TEST ELECTRIC: CPT

## 2024-05-10 PROCEDURE — 83605 ASSAY OF LACTIC ACID: CPT

## 2024-05-10 PROCEDURE — 80076 HEPATIC FUNCTION PANEL: CPT

## 2024-05-10 PROCEDURE — 85025 COMPLETE CBC W/AUTO DIFF WBC: CPT

## 2024-05-10 PROCEDURE — 84295 ASSAY OF SERUM SODIUM: CPT

## 2024-05-10 PROCEDURE — 85027 COMPLETE CBC AUTOMATED: CPT

## 2024-05-10 PROCEDURE — 85014 HEMATOCRIT: CPT

## 2024-05-10 PROCEDURE — 87641 MR-STAPH DNA AMP PROBE: CPT

## 2024-05-10 PROCEDURE — 99285 EMERGENCY DEPT VISIT HI MDM: CPT

## 2024-05-10 PROCEDURE — 86901 BLOOD TYPING SEROLOGIC RH(D): CPT

## 2024-05-10 PROCEDURE — 86900 BLOOD TYPING SEROLOGIC ABO: CPT

## 2024-05-10 PROCEDURE — 82803 BLOOD GASES ANY COMBINATION: CPT

## 2024-05-10 PROCEDURE — 85610 PROTHROMBIN TIME: CPT

## 2024-05-10 PROCEDURE — 82550 ASSAY OF CK (CPK): CPT

## 2024-05-10 PROCEDURE — 82436 ASSAY OF URINE CHLORIDE: CPT

## 2024-05-10 PROCEDURE — 94640 AIRWAY INHALATION TREATMENT: CPT

## 2024-05-10 PROCEDURE — 80178 ASSAY OF LITHIUM: CPT

## 2024-05-10 PROCEDURE — 94760 N-INVAS EAR/PLS OXIMETRY 1: CPT

## 2024-05-10 PROCEDURE — 80053 COMPREHEN METABOLIC PANEL: CPT

## 2024-05-10 PROCEDURE — 88307 TISSUE EXAM BY PATHOLOGIST: CPT

## 2024-05-10 PROCEDURE — 86850 RBC ANTIBODY SCREEN: CPT

## 2024-05-10 PROCEDURE — C1769: CPT

## 2024-05-10 PROCEDURE — 85384 FIBRINOGEN ACTIVITY: CPT

## 2024-05-10 PROCEDURE — 87640 STAPH A DNA AMP PROBE: CPT

## 2024-05-10 PROCEDURE — 84484 ASSAY OF TROPONIN QUANT: CPT

## 2024-05-10 PROCEDURE — C1725: CPT

## 2024-05-10 PROCEDURE — 82962 GLUCOSE BLOOD TEST: CPT

## 2024-05-10 PROCEDURE — 94002 VENT MGMT INPAT INIT DAY: CPT

## 2024-05-10 PROCEDURE — 76000 FLUOROSCOPY <1 HR PHYS/QHP: CPT

## 2024-05-10 PROCEDURE — 80048 BASIC METABOLIC PNL TOTAL CA: CPT

## 2024-05-10 PROCEDURE — 36415 COLL VENOUS BLD VENIPUNCTURE: CPT

## 2024-05-10 PROCEDURE — 84132 ASSAY OF SERUM POTASSIUM: CPT

## 2024-05-10 PROCEDURE — 94003 VENT MGMT INPAT SUBQ DAY: CPT

## 2024-05-10 PROCEDURE — 84100 ASSAY OF PHOSPHORUS: CPT

## 2024-05-10 PROCEDURE — 82570 ASSAY OF URINE CREATININE: CPT

## 2024-05-10 PROCEDURE — C1874: CPT

## 2024-05-10 PROCEDURE — C1889: CPT

## 2024-05-10 PROCEDURE — 74018 RADEX ABDOMEN 1 VIEW: CPT

## 2024-05-10 RX ORDER — FUROSEMIDE 40 MG
40 TABLET ORAL ONCE
Refills: 0 | Status: COMPLETED | OUTPATIENT
Start: 2024-05-10 | End: 2024-05-10

## 2024-05-10 RX ORDER — IPRATROPIUM/ALBUTEROL SULFATE 18-103MCG
3 AEROSOL WITH ADAPTER (GRAM) INHALATION ONCE
Refills: 0 | Status: COMPLETED | OUTPATIENT
Start: 2024-05-10 | End: 2024-05-10

## 2024-05-10 RX ORDER — BUDESONIDE, MICRONIZED 100 %
0.5 POWDER (GRAM) MISCELLANEOUS
Refills: 0 | Status: DISCONTINUED | OUTPATIENT
Start: 2024-05-10 | End: 2024-05-10

## 2024-05-10 RX ADMIN — Medication 63.75 MILLIMOLE(S): at 01:37

## 2024-05-10 RX ADMIN — Medication 40 MILLIGRAM(S): at 01:05

## 2024-05-10 RX ADMIN — Medication 3 MILLILITER(S): at 00:26

## 2024-05-10 RX ADMIN — Medication 125 MILLIGRAM(S): at 00:49

## 2024-05-10 NOTE — DISCHARGE NOTE FOR THE EXPIRED PATIENT - OTHER SIGNIFICANT FINDINGS
pt  within 24 hours of restraint use. Restraints discontinued 5/10 at 00:00 pt TOD 03:25. CMS log filled out.

## 2024-05-10 NOTE — DISCHARGE NOTE FOR THE EXPIRED PATIENT - SECONDARY DIAGNOSIS.
Heart failure with mildly reduced ejection fraction (HFmrEF) Acute hypoxic respiratory failure Pneumoperitoneum Septic shock Severe opioid use disorder on maintenance therapy Severe peripheral arterial disease Acute pulmonary edema COPD, severe

## 2024-05-10 NOTE — CHART NOTE - NSCHARTNOTEFT_GEN_A_CORE
Patient is a 63y Female, PMHx diffuse PAD, presents with abdominal pain, leukocytosis, and pneumoperitoneum, now s/p exploratory laparotomy with SBR, abdominal re-exploration and anastomosis; has been extubated and weaned off vasopressors.     At around midnight, patient became hypoxic to 80s despite 6L NC, was started on NRB, given duonebs. ABG sent, pO2 59.   Hypoxia worsened to 70s despite O2 therapy, and SICU team prepared for intubation; at this time, patient adamantly refused intubation and mechanical ventilation.   Patient is A&Ox4; can tell me where she is, why she is in the hospital, and states "I don't want to do this anymore, I want to die."   HCP, daughter Ophelia, was called and updated; spoke to patient on the phone, and terrance continued to refuse medical treatment.   Patient since self-d/c'd NGT, and patient's family (partner, daughter, and son) have arrived and are at bedside.     #Acute Hypoxic respiratory Failure  - Likely iso flash pulmonary edema; PE is a possibility iso hypoxia and tachycardia and postoperative status, although patient is currently therapeutic on a heparin ggt   - Patient is currently tolerating HFNC, is currently satting 80% despite maximum therapy   - On bedside POCUS, FVSF appears moderately to severely reduced, and lungs are B-line predominant bilaterally  - Appears volume overloaded on CXR; gave 40 IV lasix   - Also given duonebs and 125 solumedrol for potential COPD exacerbation component     ETHICS / GOC:  Discussed overall goals of care including advanced directives with Terrance. During this discussion we reviewed the current diagnosis, treatment plan, and likely prognosis. An explanation of advanced directives and MOLST was provided.   I explained to Terrance that if her oxygen levels continue to drop, her heart will stop, and she will die. Patient understands, states that she wants to die and doesn't want any further interventions.   Offered comfort measures; patient not interested in a morphine ggt at this time.   After this conversation decision was made by Terrance to change status to DNR/DNI. MOLST form completed, signed, and placed in chart.     Above was reviewed with SICU attending physician Dr. Lopez.

## 2024-05-10 NOTE — DISCHARGE NOTE FOR THE EXPIRED PATIENT - FINDINGS/TREATMENT
Abthera removed. Bowel interrogated from LOT to TI. No evidence of bowel ischemia.  SMA pulse strong and palpable.   Two ends of jejunum left in discontinuity was reanastomosed using a COLETTE stapler (~ 5cm removed), one limb concerning after anastomosis, and an additional 5cm resected.   Common channel patent.   Additional 220 cm of bowel remained. SMA identified, no palpable pulse. Vessel dissected and endovascular access achieved in retrograde fashion. Angiogram demonstrating SMA occlusion. Balloon angioplasty of SMA with stent placement. Restoration of blood flow confirmed on angiogram. Arteriotomy closed with 6-0 prolene sutures. Hemostasis achieved. Midline laparotomy incision. No frankly ischemic bowel segments. Patchy areas of dusky appearance. Small perforation identified in mid jejunum. Resected and bowel left in discontinuity. Abdomen thoroughly irrigated and hemostasis achieved. Abthera wound vac dressing applied.

## 2024-05-10 NOTE — DISCHARGE NOTE FOR THE EXPIRED PATIENT - HOSPITAL COURSE
Patient is a 63y Female, PMHx diffuse severe PAD, presented on 5/5/24 with abdominal pain, was found to have severe SMA stenosis, small bowel ischemia, and pneumoperitoneum.  Went for exploratory laparotomy with SBR and SMA stent on 5/5, was left open in discontinuity. RTOR on 5/6 and 5/7 for re-exploration and anastomosis.   Patient was managed in the SICU, on heparin ggt for SMA disease. Was on vasopressors and intubated chana-operatively; weaned off pressors and extubated 5/8.   Patient developed acute hypoxic respiratory failure on 5/9, and patient made the decision to be DNR/DNI, to allow for a natural death.

## 2024-05-13 LAB — SURGICAL PATHOLOGY STUDY: SIGNIFICANT CHANGE UP

## 2024-05-14 LAB — SURGICAL PATHOLOGY STUDY: SIGNIFICANT CHANGE UP

## 2024-10-07 NOTE — DIETITIAN INITIAL EVALUATION ADULT - PERTINENT LABORATORY DATA
c/o chronic back pain
05-07    136  |  103  |  7.6<L>  ----------------------------<  64<L>  3.5   |  18.0<L>  |  0.57    Ca    7.6<L>      07 May 2024 03:30  Phos  2.8     05-07  Mg     2.1     05-07    TPro  3.9<L>  /  Alb  1.8<L>  /  TBili  0.3<L>  /  DBili  0.2  /  AST  42<H>  /  ALT  10  /  AlkPhos  50  05-07  POCT Blood Glucose.: 208 mg/dL (05-07-24 @ 05:44)

## (undated) DEVICE — TROCAR COVIDIEN VERSAPORT BLADELESS OPTICAL 5MM STANDARD

## (undated) DEVICE — STAPLER SKIN PROXIMATE

## (undated) DEVICE — PACK GENERAL LAPAROSCOPY

## (undated) DEVICE — ENDOCATCH 10MM SPECIMEN POUCH

## (undated) DEVICE — Device

## (undated) DEVICE — DRSG VAC ABTHERS

## (undated) DEVICE — LIGASURE IMPACT

## (undated) DEVICE — TUBING INSUFFLATION LAP FILTER 10FT

## (undated) DEVICE — DRSG MASTISOL

## (undated) DEVICE — SYR CONTROL LUER LOK 10CC

## (undated) DEVICE — SUT VICRYL 0 36" CT-1 UNDYED

## (undated) DEVICE — SUT PDS II 2-0 27" CT-1

## (undated) DEVICE — SUT VICRYL 0 27" UR-6

## (undated) DEVICE — VENODYNE/SCD SLEEVE CALF MEDIUM

## (undated) DEVICE — TROCAR COVIDIEN VERSAPORT BLADELESS OPTICAL 12MM STANDARD

## (undated) DEVICE — DRSG STERISTRIPS 0.5 X 4"

## (undated) DEVICE — SUT PDS II 1 48" TP-1

## (undated) DEVICE — TUBING STRYKEFLOW II SUCTION / IRRIGATOR

## (undated) DEVICE — TROCAR COVIDIEN VERSAONE FIXATION CANNULA 5MM

## (undated) DEVICE — GLV 8 PROTEXIS (BLUE)

## (undated) DEVICE — SUT VICRYL PLUS 4-0 18" PS-2 UNDYED

## (undated) DEVICE — PACK MAJOR ABDOMINAL WITH LAP

## (undated) DEVICE — POOLE SUCTION TIP

## (undated) DEVICE — WARMING BLANKET UPPER ADULT

## (undated) DEVICE — ELCTR CORD FOOTSWITCH 1PLR LAPSCP 10FT

## (undated) DEVICE — DRAPE 1/2 SHEET 40X57"

## (undated) DEVICE — DRSG PREVENA PLUS SYSTEM

## (undated) DEVICE — D HELP - CLEARVIEW CLEARIFY SYSTEM

## (undated) DEVICE — STAPLER COVIDIEN ENDO GIA STANDARD HANDLE

## (undated) DEVICE — PRIORITY PACK WITH COPILOT 20/30

## (undated) DEVICE — SOL IRR POUR H2O 1000ML

## (undated) DEVICE — FOLEY TRAY 16FR 5CC LF UMETER CLOSED

## (undated) DEVICE — ELCTR ROCKER SWITCH PENCIL BLUE 10FT

## (undated) DEVICE — DISSECTOR ENDOSCOPIC KITTNER SINGLE TIP

## (undated) DEVICE — DRAPE FLUID WARMER 44 X 66"

## (undated) DEVICE — ELCTR BOVIE BLADE 3/4" EXTENDED LENGTH 6"

## (undated) DEVICE — LIGASURE MARYLAND 37CM

## (undated) DEVICE — RETAINER VICERA FISH LG

## (undated) DEVICE — DRSG DERMABOND 0.7ML

## (undated) DEVICE — GLV 8 PROTEXIS (WHITE)

## (undated) DEVICE — SOL IRR POUR NS 0.9% 1000ML

## (undated) DEVICE — SUT MONOCRYL 4-0 27" PS-2 UNDYED

## (undated) DEVICE — ELCTR GROUNDING PAD ADULT COVIDIEN